# Patient Record
Sex: FEMALE | Race: ASIAN | NOT HISPANIC OR LATINO
[De-identification: names, ages, dates, MRNs, and addresses within clinical notes are randomized per-mention and may not be internally consistent; named-entity substitution may affect disease eponyms.]

---

## 2017-01-17 ENCOUNTER — TRANSCRIPTION ENCOUNTER (OUTPATIENT)
Age: 37
End: 2017-01-17

## 2017-02-08 ENCOUNTER — TRANSCRIPTION ENCOUNTER (OUTPATIENT)
Age: 37
End: 2017-02-08

## 2018-09-07 ENCOUNTER — APPOINTMENT (OUTPATIENT)
Dept: OBGYN | Facility: CLINIC | Age: 38
End: 2018-09-07
Payer: COMMERCIAL

## 2018-09-07 VITALS
WEIGHT: 112.5 LBS | HEART RATE: 73 BPM | HEIGHT: 65 IN | BODY MASS INDEX: 18.74 KG/M2 | SYSTOLIC BLOOD PRESSURE: 117 MMHG | DIASTOLIC BLOOD PRESSURE: 83 MMHG

## 2018-09-07 DIAGNOSIS — Z01.419 ENCOUNTER FOR GYNECOLOGICAL EXAMINATION (GENERAL) (ROUTINE) W/OUT ABNORMAL FINDINGS: ICD-10-CM

## 2018-09-07 DIAGNOSIS — N89.8 OTHER SPECIFIED NONINFLAMMATORY DISORDERS OF VAGINA: ICD-10-CM

## 2018-09-07 PROCEDURE — 99213 OFFICE O/P EST LOW 20 MIN: CPT | Mod: 25

## 2018-09-07 PROCEDURE — 99395 PREV VISIT EST AGE 18-39: CPT

## 2018-09-10 ENCOUNTER — OTHER (OUTPATIENT)
Age: 38
End: 2018-09-10

## 2018-09-10 DIAGNOSIS — B96.89 ACUTE VAGINITIS: ICD-10-CM

## 2018-09-10 DIAGNOSIS — N76.0 ACUTE VAGINITIS: ICD-10-CM

## 2018-10-13 LAB
C TRACH RRNA SPEC QL NAA+PROBE: NOT DETECTED
CANDIDA VAG CYTO: NOT DETECTED
CYTOLOGY CVX/VAG DOC THIN PREP: NORMAL
G VAGINALIS+PREV SP MTYP VAG QL MICRO: DETECTED
HPV HIGH+LOW RISK DNA PNL CVX: NOT DETECTED
N GONORRHOEA RRNA SPEC QL NAA+PROBE: NOT DETECTED
SOURCE AMPLIFICATION: NORMAL
T VAGINALIS VAG QL WET PREP: NOT DETECTED

## 2019-04-08 ENCOUNTER — APPOINTMENT (OUTPATIENT)
Dept: INTERNAL MEDICINE | Facility: CLINIC | Age: 39
End: 2019-04-08

## 2019-07-08 ENCOUNTER — APPOINTMENT (OUTPATIENT)
Dept: FAMILY MEDICINE | Facility: CLINIC | Age: 39
End: 2019-07-08
Payer: COMMERCIAL

## 2019-07-08 ENCOUNTER — LABORATORY RESULT (OUTPATIENT)
Age: 39
End: 2019-07-08

## 2019-07-08 VITALS
HEART RATE: 80 BPM | HEIGHT: 65 IN | DIASTOLIC BLOOD PRESSURE: 76 MMHG | OXYGEN SATURATION: 100 % | SYSTOLIC BLOOD PRESSURE: 111 MMHG | WEIGHT: 121 LBS | BODY MASS INDEX: 20.16 KG/M2

## 2019-07-08 PROCEDURE — 90715 TDAP VACCINE 7 YRS/> IM: CPT

## 2019-07-08 PROCEDURE — 99385 PREV VISIT NEW AGE 18-39: CPT | Mod: 25

## 2019-07-08 PROCEDURE — 90471 IMMUNIZATION ADMIN: CPT

## 2019-07-08 PROCEDURE — 36415 COLL VENOUS BLD VENIPUNCTURE: CPT

## 2019-07-08 RX ORDER — METRONIDAZOLE 7.5 MG/G
0.75 GEL VAGINAL
Qty: 1 | Refills: 0 | Status: COMPLETED | COMMUNITY
Start: 2018-09-10 | End: 2019-07-08

## 2019-07-08 NOTE — HISTORY OF PRESENT ILLNESS
[FreeTextEntry1] : annual physical [de-identified] : 37 yo F with no significant PMH presents for annual physical. It has been 2 years since her last physical. No complaints at this time.

## 2019-07-08 NOTE — HEALTH RISK ASSESSMENT
[] : No [No] : No [No falls in past year] : Patient reported no falls in the past year [de-identified] : regular [de-identified] : walks 1-2 blocks 2-3 times a week [[id number]] : 8 [Patient reported PAP Smear was normal] : Patient reported PAP Smear was normal [HIV test declined] : HIV test declined [] :  [Hepatitis C test declined] : Hepatitis C test declined [Fully functional (using the telephone, shopping, preparing meals, housekeeping, doing laundry, using] : Fully functional and needs no help or supervision to perform IADLs (using the telephone, shopping, preparing meals, housekeeping, doing laundry, using transportation, managing medications and managing finances) [Fully functional (bathing, dressing, toileting, transferring, walking, feeding)] : Fully functional (bathing, dressing, toileting, transferring, walking, feeding) [PapSmearDate] : 09/18

## 2019-07-11 LAB
25(OH)D3 SERPL-MCNC: 21.6 NG/ML
ALBUMIN SERPL ELPH-MCNC: 4.5 G/DL
ALP BLD-CCNC: 87 U/L
ALT SERPL-CCNC: 11 U/L
ANION GAP SERPL CALC-SCNC: 11 MMOL/L
AST SERPL-CCNC: 17 U/L
BASOPHILS # BLD AUTO: 0.04 K/UL
BASOPHILS NFR BLD AUTO: 0.6 %
BILIRUB SERPL-MCNC: 0.4 MG/DL
BUN SERPL-MCNC: 17 MG/DL
CALCIUM SERPL-MCNC: 9.3 MG/DL
CHLORIDE SERPL-SCNC: 105 MMOL/L
CHOLEST SERPL-MCNC: 178 MG/DL
CHOLEST/HDLC SERPL: 2.4 RATIO
CO2 SERPL-SCNC: 26 MMOL/L
CREAT SERPL-MCNC: 0.89 MG/DL
EOSINOPHIL # BLD AUTO: 0.09 K/UL
EOSINOPHIL NFR BLD AUTO: 1.4 %
ESTIMATED AVERAGE GLUCOSE: 105 MG/DL
GLUCOSE SERPL-MCNC: 95 MG/DL
HBA1C MFR BLD HPLC: 5.3 %
HCT VFR BLD CALC: 44.9 %
HDLC SERPL-MCNC: 75 MG/DL
HGB BLD-MCNC: 14.1 G/DL
IMM GRANULOCYTES NFR BLD AUTO: 0.3 %
LDLC SERPL CALC-MCNC: 78 MG/DL
LYMPHOCYTES # BLD AUTO: 2.06 K/UL
LYMPHOCYTES NFR BLD AUTO: 31.4 %
MAN DIFF?: NORMAL
MCHC RBC-ENTMCNC: 31.4 GM/DL
MCHC RBC-ENTMCNC: 32.7 PG
MCV RBC AUTO: 104.2 FL
MONOCYTES # BLD AUTO: 0.57 K/UL
MONOCYTES NFR BLD AUTO: 8.7 %
NEUTROPHILS # BLD AUTO: 3.78 K/UL
NEUTROPHILS NFR BLD AUTO: 57.6 %
PLATELET # BLD AUTO: 232 K/UL
POTASSIUM SERPL-SCNC: 4.8 MMOL/L
PROT SERPL-MCNC: 7.2 G/DL
RBC # BLD: 4.31 M/UL
RBC # FLD: 12.4 %
SODIUM SERPL-SCNC: 142 MMOL/L
TRIGL SERPL-MCNC: 127 MG/DL
TSH SERPL-ACNC: 1.06 UIU/ML
WBC # FLD AUTO: 6.56 K/UL

## 2019-08-13 ENCOUNTER — TRANSCRIPTION ENCOUNTER (OUTPATIENT)
Age: 39
End: 2019-08-13

## 2019-12-10 ENCOUNTER — TRANSCRIPTION ENCOUNTER (OUTPATIENT)
Age: 39
End: 2019-12-10

## 2019-12-22 ENCOUNTER — TRANSCRIPTION ENCOUNTER (OUTPATIENT)
Age: 39
End: 2019-12-22

## 2020-04-25 ENCOUNTER — MESSAGE (OUTPATIENT)
Age: 40
End: 2020-04-25

## 2020-04-29 ENCOUNTER — APPOINTMENT (OUTPATIENT)
Dept: FAMILY MEDICINE | Facility: CLINIC | Age: 40
End: 2020-04-29

## 2020-05-07 LAB
SARS-COV-2 IGG SERPL IA-ACNC: <0.1 INDEX
SARS-COV-2 IGG SERPL QL IA: NEGATIVE

## 2020-05-13 ENCOUNTER — APPOINTMENT (OUTPATIENT)
Dept: OBGYN | Facility: CLINIC | Age: 40
End: 2020-05-13

## 2020-07-16 ENCOUNTER — APPOINTMENT (OUTPATIENT)
Dept: FAMILY MEDICINE | Facility: CLINIC | Age: 40
End: 2020-07-16
Payer: COMMERCIAL

## 2020-07-16 VITALS
BODY MASS INDEX: 22.07 KG/M2 | HEIGHT: 64.17 IN | TEMPERATURE: 99.3 F | WEIGHT: 129.28 LBS | OXYGEN SATURATION: 98 % | SYSTOLIC BLOOD PRESSURE: 106 MMHG | DIASTOLIC BLOOD PRESSURE: 67 MMHG | HEART RATE: 72 BPM

## 2020-07-16 PROCEDURE — 36415 COLL VENOUS BLD VENIPUNCTURE: CPT

## 2020-07-16 PROCEDURE — 99395 PREV VISIT EST AGE 18-39: CPT | Mod: 25

## 2020-07-17 ENCOUNTER — TRANSCRIPTION ENCOUNTER (OUTPATIENT)
Age: 40
End: 2020-07-17

## 2020-07-17 LAB
25(OH)D3 SERPL-MCNC: 32.5 NG/ML
ALBUMIN SERPL ELPH-MCNC: 4.6 G/DL
ALP BLD-CCNC: 70 U/L
ALT SERPL-CCNC: 14 U/L
ANION GAP SERPL CALC-SCNC: 13 MMOL/L
AST SERPL-CCNC: 20 U/L
BASOPHILS # BLD AUTO: 0.04 K/UL
BASOPHILS NFR BLD AUTO: 0.7 %
BILIRUB SERPL-MCNC: 0.4 MG/DL
BUN SERPL-MCNC: 13 MG/DL
CALCIUM SERPL-MCNC: 9.5 MG/DL
CHLORIDE SERPL-SCNC: 104 MMOL/L
CHOLEST SERPL-MCNC: 148 MG/DL
CHOLEST/HDLC SERPL: 2.5 RATIO
CO2 SERPL-SCNC: 25 MMOL/L
CREAT SERPL-MCNC: 0.73 MG/DL
EOSINOPHIL # BLD AUTO: 0.05 K/UL
EOSINOPHIL NFR BLD AUTO: 0.9 %
ESTIMATED AVERAGE GLUCOSE: 103 MG/DL
GLUCOSE SERPL-MCNC: 104 MG/DL
HBA1C MFR BLD HPLC: 5.2 %
HCT VFR BLD CALC: 40.5 %
HDLC SERPL-MCNC: 60 MG/DL
HGB BLD-MCNC: 13.2 G/DL
IMM GRANULOCYTES NFR BLD AUTO: 0.2 %
LDLC SERPL CALC-MCNC: 73 MG/DL
LYMPHOCYTES # BLD AUTO: 1.74 K/UL
LYMPHOCYTES NFR BLD AUTO: 31.9 %
MAN DIFF?: NORMAL
MCHC RBC-ENTMCNC: 32.6 GM/DL
MCHC RBC-ENTMCNC: 32.6 PG
MCV RBC AUTO: 100 FL
MONOCYTES # BLD AUTO: 0.51 K/UL
MONOCYTES NFR BLD AUTO: 9.3 %
NEUTROPHILS # BLD AUTO: 3.11 K/UL
NEUTROPHILS NFR BLD AUTO: 57 %
PLATELET # BLD AUTO: 260 K/UL
POTASSIUM SERPL-SCNC: 4.8 MMOL/L
PROT SERPL-MCNC: 7.2 G/DL
RBC # BLD: 4.05 M/UL
RBC # FLD: 12 %
SODIUM SERPL-SCNC: 142 MMOL/L
TRIGL SERPL-MCNC: 81 MG/DL
TSH SERPL-ACNC: 0.43 UIU/ML
VIT B12 SERPL-MCNC: 402 PG/ML
WBC # FLD AUTO: 5.46 K/UL

## 2020-07-20 ENCOUNTER — APPOINTMENT (OUTPATIENT)
Dept: FAMILY MEDICINE | Facility: CLINIC | Age: 40
End: 2020-07-20

## 2020-12-16 PROBLEM — N76.0 BACTERIAL VAGINOSIS: Status: RESOLVED | Noted: 2018-09-10 | Resolved: 2020-12-16

## 2021-01-17 ENCOUNTER — APPOINTMENT (OUTPATIENT)
Dept: OBGYN | Facility: CLINIC | Age: 41
End: 2021-01-17

## 2021-02-19 ENCOUNTER — NON-APPOINTMENT (OUTPATIENT)
Age: 41
End: 2021-02-19

## 2021-02-19 ENCOUNTER — APPOINTMENT (OUTPATIENT)
Dept: DERMATOLOGY | Facility: CLINIC | Age: 41
End: 2021-02-19
Payer: COMMERCIAL

## 2021-02-19 ENCOUNTER — LABORATORY RESULT (OUTPATIENT)
Age: 41
End: 2021-02-19

## 2021-02-19 VITALS — BODY MASS INDEX: 22.2 KG/M2 | HEIGHT: 64 IN | WEIGHT: 130 LBS

## 2021-02-19 DIAGNOSIS — R23.8 OTHER SKIN CHANGES: ICD-10-CM

## 2021-02-19 PROCEDURE — 99072 ADDL SUPL MATRL&STAF TM PHE: CPT

## 2021-02-19 PROCEDURE — 11104 PUNCH BX SKIN SINGLE LESION: CPT

## 2021-02-19 PROCEDURE — 99203 OFFICE O/P NEW LOW 30 MIN: CPT | Mod: 25,GC

## 2021-02-20 PROBLEM — R23.8 PAPULE OF SKIN: Status: ACTIVE | Noted: 2021-02-20

## 2021-03-05 ENCOUNTER — APPOINTMENT (OUTPATIENT)
Dept: DERMATOLOGY | Facility: CLINIC | Age: 41
End: 2021-03-05

## 2021-03-05 ENCOUNTER — APPOINTMENT (OUTPATIENT)
Dept: DERMATOLOGY | Facility: CLINIC | Age: 41
End: 2021-03-05
Payer: COMMERCIAL

## 2021-03-05 VITALS — HEIGHT: 64 IN

## 2021-03-05 DIAGNOSIS — D48.5 NEOPLASM OF UNCERTAIN BEHAVIOR OF SKIN: ICD-10-CM

## 2021-03-05 PROCEDURE — 99212 OFFICE O/P EST SF 10 MIN: CPT

## 2021-03-05 PROCEDURE — 99072 ADDL SUPL MATRL&STAF TM PHE: CPT

## 2021-12-15 ENCOUNTER — NON-APPOINTMENT (OUTPATIENT)
Age: 41
End: 2021-12-15

## 2021-12-15 ENCOUNTER — APPOINTMENT (OUTPATIENT)
Dept: OBGYN | Facility: CLINIC | Age: 41
End: 2021-12-15
Payer: COMMERCIAL

## 2021-12-15 ENCOUNTER — APPOINTMENT (OUTPATIENT)
Dept: OBGYN | Facility: CLINIC | Age: 41
End: 2021-12-15

## 2021-12-15 VITALS
TEMPERATURE: 97.3 F | BODY MASS INDEX: 22.2 KG/M2 | HEIGHT: 64 IN | HEART RATE: 87 BPM | WEIGHT: 130 LBS | DIASTOLIC BLOOD PRESSURE: 78 MMHG | SYSTOLIC BLOOD PRESSURE: 118 MMHG

## 2021-12-15 DIAGNOSIS — L65.9 NONSCARRING HAIR LOSS, UNSPECIFIED: ICD-10-CM

## 2021-12-15 DIAGNOSIS — N85.2 HYPERTROPHY OF UTERUS: ICD-10-CM

## 2021-12-15 PROCEDURE — 99214 OFFICE O/P EST MOD 30 MIN: CPT | Mod: 25

## 2021-12-15 PROCEDURE — 99386 PREV VISIT NEW AGE 40-64: CPT

## 2021-12-16 ENCOUNTER — ASOB RESULT (OUTPATIENT)
Age: 41
End: 2021-12-16

## 2021-12-16 ENCOUNTER — APPOINTMENT (OUTPATIENT)
Dept: OBGYN | Facility: CLINIC | Age: 41
End: 2021-12-16
Payer: COMMERCIAL

## 2021-12-16 PROCEDURE — 76830 TRANSVAGINAL US NON-OB: CPT

## 2021-12-20 ENCOUNTER — TRANSCRIPTION ENCOUNTER (OUTPATIENT)
Age: 41
End: 2021-12-20

## 2021-12-28 LAB
ANTI-MUELLERIAN HORMONE: 1.65 NG/ML
C TRACH RRNA SPEC QL NAA+PROBE: NOT DETECTED
CYTOLOGY CVX/VAG DOC THIN PREP: NORMAL
FSH SERPL-MCNC: 5.4 IU/L
HPV HIGH+LOW RISK DNA PNL CVX: NOT DETECTED
N GONORRHOEA RRNA SPEC QL NAA+PROBE: NOT DETECTED
SOURCE AMPLIFICATION: NORMAL
T4 FREE SERPL-MCNC: 1.4 NG/DL
TSH SERPL-ACNC: 0.9 UIU/ML

## 2022-04-14 ENCOUNTER — APPOINTMENT (OUTPATIENT)
Dept: FAMILY MEDICINE | Facility: CLINIC | Age: 42
End: 2022-04-14
Payer: COMMERCIAL

## 2022-04-14 VITALS
BODY MASS INDEX: 22.02 KG/M2 | HEIGHT: 64 IN | WEIGHT: 129 LBS | SYSTOLIC BLOOD PRESSURE: 119 MMHG | HEART RATE: 74 BPM | DIASTOLIC BLOOD PRESSURE: 81 MMHG | OXYGEN SATURATION: 100 %

## 2022-04-14 DIAGNOSIS — R06.02 SHORTNESS OF BREATH: ICD-10-CM

## 2022-04-14 PROCEDURE — 99396 PREV VISIT EST AGE 40-64: CPT

## 2022-04-14 NOTE — HISTORY OF PRESENT ILLNESS
[FreeTextEntry1] : annual [de-identified] : 42 yo F with no significant PMH presents for annual. \par \par Had covid Dec 2021-- SOB/fever/loss of taste and smell. Has issues with breathing since then-- anne with activities and prolonged talking. Feels need to catch breath.\par \par diet-- good\par exercise-- could be better but has SOB.

## 2022-04-14 NOTE — HEALTH RISK ASSESSMENT
[Patient reported PAP Smear was normal] : Patient reported PAP Smear was normal [PapSmearDate] : 12/21

## 2022-04-15 ENCOUNTER — NON-APPOINTMENT (OUTPATIENT)
Age: 42
End: 2022-04-15

## 2022-04-15 LAB
25(OH)D3 SERPL-MCNC: 15 NG/ML
ALBUMIN SERPL ELPH-MCNC: 4.3 G/DL
ALP BLD-CCNC: 79 U/L
ALT SERPL-CCNC: 15 U/L
ANION GAP SERPL CALC-SCNC: 11 MMOL/L
AST SERPL-CCNC: 21 U/L
BASOPHILS # BLD AUTO: 0.04 K/UL
BASOPHILS NFR BLD AUTO: 0.5 %
BILIRUB SERPL-MCNC: 0.3 MG/DL
BUN SERPL-MCNC: 19 MG/DL
CALCIUM SERPL-MCNC: 9.1 MG/DL
CHLORIDE SERPL-SCNC: 107 MMOL/L
CHOLEST SERPL-MCNC: 170 MG/DL
CO2 SERPL-SCNC: 24 MMOL/L
CREAT SERPL-MCNC: 0.68 MG/DL
EGFR: 113 ML/MIN/1.73M2
EOSINOPHIL # BLD AUTO: 0.08 K/UL
EOSINOPHIL NFR BLD AUTO: 1.1 %
ESTIMATED AVERAGE GLUCOSE: 111 MG/DL
GLUCOSE SERPL-MCNC: 92 MG/DL
HBA1C MFR BLD HPLC: 5.5 %
HCT VFR BLD CALC: 41.8 %
HDLC SERPL-MCNC: 63 MG/DL
HGB BLD-MCNC: 13.7 G/DL
IMM GRANULOCYTES NFR BLD AUTO: 0.3 %
LDLC SERPL CALC-MCNC: 81 MG/DL
LYMPHOCYTES # BLD AUTO: 2.3 K/UL
LYMPHOCYTES NFR BLD AUTO: 30.5 %
MAN DIFF?: NORMAL
MCHC RBC-ENTMCNC: 31.1 PG
MCHC RBC-ENTMCNC: 32.8 GM/DL
MCV RBC AUTO: 95 FL
MONOCYTES # BLD AUTO: 0.6 K/UL
MONOCYTES NFR BLD AUTO: 8 %
NEUTROPHILS # BLD AUTO: 4.49 K/UL
NEUTROPHILS NFR BLD AUTO: 59.6 %
NONHDLC SERPL-MCNC: 107 MG/DL
PLATELET # BLD AUTO: 269 K/UL
POTASSIUM SERPL-SCNC: 4.3 MMOL/L
PROT SERPL-MCNC: 7.2 G/DL
RBC # BLD: 4.4 M/UL
RBC # FLD: 12.2 %
SODIUM SERPL-SCNC: 142 MMOL/L
TRIGL SERPL-MCNC: 128 MG/DL
TSH SERPL-ACNC: 1.04 UIU/ML
WBC # FLD AUTO: 7.53 K/UL

## 2022-06-08 ENCOUNTER — NON-APPOINTMENT (OUTPATIENT)
Age: 42
End: 2022-06-08

## 2022-06-15 ENCOUNTER — APPOINTMENT (OUTPATIENT)
Dept: OBGYN | Facility: CLINIC | Age: 42
End: 2022-06-15
Payer: COMMERCIAL

## 2022-06-15 ENCOUNTER — ASOB RESULT (OUTPATIENT)
Age: 42
End: 2022-06-15

## 2022-06-15 VITALS
BODY MASS INDEX: 22.71 KG/M2 | DIASTOLIC BLOOD PRESSURE: 76 MMHG | HEART RATE: 72 BPM | SYSTOLIC BLOOD PRESSURE: 117 MMHG | HEIGHT: 64 IN | WEIGHT: 133 LBS

## 2022-06-15 PROCEDURE — 76817 TRANSVAGINAL US OBSTETRIC: CPT

## 2022-06-15 PROCEDURE — ZZZZZ: CPT | Mod: 1L

## 2022-06-15 RX ORDER — PNV/FERROUS SULFATE/FOLIC ACID 27-<0.5MG
TABLET ORAL
Refills: 0 | Status: ACTIVE | COMMUNITY

## 2022-06-16 LAB
ABO + RH PNL BLD: NORMAL
BASOPHILS # BLD AUTO: 0.04 K/UL
BASOPHILS NFR BLD AUTO: 0.4 %
BLD GP AB SCN SERPL QL: NORMAL
C TRACH RRNA SPEC QL NAA+PROBE: NOT DETECTED
CMV IGG SERPL QL: 1.2 U/ML
CMV IGG SERPL-IMP: POSITIVE
CMV IGM SERPL QL: <8 AU/ML
CMV IGM SERPL QL: NEGATIVE
EOSINOPHIL # BLD AUTO: 0.07 K/UL
EOSINOPHIL NFR BLD AUTO: 0.7 %
HBV SURFACE AG SER QL: NONREACTIVE
HCT VFR BLD CALC: 39.2 %
HGB A MFR BLD: 96.7 %
HGB A2 MFR BLD: 2.8 %
HGB BLD-MCNC: 13.3 G/DL
HGB F MFR BLD: 0.5 %
HGB FRACT BLD-IMP: NORMAL
HIV1+2 AB SPEC QL IA.RAPID: NONREACTIVE
IMM GRANULOCYTES NFR BLD AUTO: 0.4 %
LYMPHOCYTES # BLD AUTO: 1.9 K/UL
LYMPHOCYTES NFR BLD AUTO: 18.9 %
MAN DIFF?: NORMAL
MCHC RBC-ENTMCNC: 32 PG
MCHC RBC-ENTMCNC: 33.9 GM/DL
MCV RBC AUTO: 94.2 FL
MEV IGG FLD QL IA: <5 AU/ML
MEV IGG+IGM SER-IMP: NEGATIVE
MONOCYTES # BLD AUTO: 0.73 K/UL
MONOCYTES NFR BLD AUTO: 7.3 %
N GONORRHOEA RRNA SPEC QL NAA+PROBE: NOT DETECTED
NEUTROPHILS # BLD AUTO: 7.28 K/UL
NEUTROPHILS NFR BLD AUTO: 72.3 %
PLATELET # BLD AUTO: 266 K/UL
RBC # BLD: 4.16 M/UL
RBC # FLD: 12.6 %
RUBV IGG FLD-ACNC: 1.2 INDEX
RUBV IGG SER-IMP: POSITIVE
SOURCE AMPLIFICATION: NORMAL
T GONDII AB SER-IMP: NEGATIVE
T GONDII AB SER-IMP: NEGATIVE
T GONDII IGG SER QL: <3 IU/ML
T GONDII IGM SER QL: <3 AU/ML
T PALLIDUM AB SER QL IA: NEGATIVE
VZV AB TITR SER: POSITIVE
VZV IGG SER IF-ACNC: 836.7 INDEX
WBC # FLD AUTO: 10.06 K/UL

## 2022-06-17 LAB
BACTERIA UR CULT: NORMAL
LEAD BLD-MCNC: <1 UG/DL
MEV IGM SER QL: <0.91 ISR

## 2022-06-24 LAB
B19V IGG SER QL IA: 8.7 INDEX
B19V IGG+IGM SER-IMP: NEGATIVE
B19V IGG+IGM SER-IMP: NORMAL
B19V IGM FLD-ACNC: 0.08 INDEX
B19V IGM SER-ACNC: NEGATIVE

## 2022-07-19 ENCOUNTER — APPOINTMENT (OUTPATIENT)
Dept: OBGYN | Facility: CLINIC | Age: 42
End: 2022-07-19

## 2022-07-19 VITALS
WEIGHT: 133 LBS | SYSTOLIC BLOOD PRESSURE: 139 MMHG | DIASTOLIC BLOOD PRESSURE: 83 MMHG | HEART RATE: 96 BPM | BODY MASS INDEX: 22.71 KG/M2 | HEIGHT: 64 IN

## 2022-07-27 ENCOUNTER — LABORATORY RESULT (OUTPATIENT)
Age: 42
End: 2022-07-27

## 2022-07-27 ENCOUNTER — APPOINTMENT (OUTPATIENT)
Dept: ANTEPARTUM | Facility: CLINIC | Age: 42
End: 2022-07-27

## 2022-07-27 ENCOUNTER — ASOB RESULT (OUTPATIENT)
Age: 42
End: 2022-07-27

## 2022-07-27 PROCEDURE — 36415 COLL VENOUS BLD VENIPUNCTURE: CPT

## 2022-07-27 PROCEDURE — 76813 OB US NUCHAL MEAS 1 GEST: CPT

## 2022-07-27 PROCEDURE — 76801 OB US < 14 WKS SINGLE FETUS: CPT

## 2022-08-01 ENCOUNTER — NON-APPOINTMENT (OUTPATIENT)
Age: 42
End: 2022-08-01

## 2022-08-11 ENCOUNTER — APPOINTMENT (OUTPATIENT)
Dept: PEDIATRIC MEDICAL GENETICS | Facility: CLINIC | Age: 42
End: 2022-08-11

## 2022-08-11 PROCEDURE — 96040: CPT | Mod: 95

## 2022-08-12 ENCOUNTER — NON-APPOINTMENT (OUTPATIENT)
Age: 42
End: 2022-08-12

## 2022-08-17 ENCOUNTER — APPOINTMENT (OUTPATIENT)
Dept: OBGYN | Facility: CLINIC | Age: 42
End: 2022-08-17

## 2022-08-17 ENCOUNTER — LABORATORY RESULT (OUTPATIENT)
Age: 42
End: 2022-08-17

## 2022-08-17 VITALS
HEART RATE: 83 BPM | SYSTOLIC BLOOD PRESSURE: 143 MMHG | BODY MASS INDEX: 22.71 KG/M2 | DIASTOLIC BLOOD PRESSURE: 89 MMHG | WEIGHT: 133 LBS | HEIGHT: 64 IN

## 2022-08-18 ENCOUNTER — NON-APPOINTMENT (OUTPATIENT)
Age: 42
End: 2022-08-18

## 2022-08-18 ENCOUNTER — APPOINTMENT (OUTPATIENT)
Dept: CARDIOLOGY | Facility: CLINIC | Age: 42
End: 2022-08-18

## 2022-08-18 ENCOUNTER — LABORATORY RESULT (OUTPATIENT)
Age: 42
End: 2022-08-18

## 2022-08-18 VITALS — SYSTOLIC BLOOD PRESSURE: 120 MMHG | DIASTOLIC BLOOD PRESSURE: 70 MMHG

## 2022-08-18 VITALS
BODY MASS INDEX: 22.2 KG/M2 | TEMPERATURE: 98.2 F | HEART RATE: 83 BPM | OXYGEN SATURATION: 98 % | SYSTOLIC BLOOD PRESSURE: 115 MMHG | WEIGHT: 130 LBS | DIASTOLIC BLOOD PRESSURE: 71 MMHG | HEIGHT: 64 IN

## 2022-08-18 DIAGNOSIS — Z13.228 ENCOUNTER FOR SCREENING FOR OTHER METABOLIC DISORDERS: ICD-10-CM

## 2022-08-18 DIAGNOSIS — R01.1 CARDIAC MURMUR, UNSPECIFIED: ICD-10-CM

## 2022-08-18 PROCEDURE — 93000 ELECTROCARDIOGRAM COMPLETE: CPT

## 2022-08-18 PROCEDURE — 99204 OFFICE O/P NEW MOD 45 MIN: CPT

## 2022-08-18 NOTE — HISTORY OF PRESENT ILLNESS
[FreeTextEntry1] : This is a 42 year old female  15 weeks pregnant who presents to the office as a new patient for cardiac eval. pt states she went to her OBGYN and was told her BP is 143/89 \par \par Pt denies any CP, SOB, heart palpitations, dizziness, abdominal pain N/V/D fever or chills\par \par \par PCP Dr. Rick- seen in april 2022

## 2022-08-18 NOTE — PHYSICAL EXAM
[Well Developed] : well developed [Well Nourished] : well nourished [No Acute Distress] : no acute distress [Normal Conjunctiva] : normal conjunctiva [Normal Venous Pressure] : normal venous pressure [No Carotid Bruit] : no carotid bruit [Normal S1, S2] : normal S1, S2 [No Rub] : no rub [No Gallop] : no gallop [Clear Lung Fields] : clear lung fields [Good Air Entry] : good air entry [No Respiratory Distress] : no respiratory distress  [Soft] : abdomen soft [Non Tender] : non-tender [No Masses/organomegaly] : no masses/organomegaly [Normal Bowel Sounds] : normal bowel sounds [Normal Gait] : normal gait [No Edema] : no edema [No Cyanosis] : no cyanosis [No Clubbing] : no clubbing [No Varicosities] : no varicosities [No Rash] : no rash [No Skin Lesions] : no skin lesions [Moves all extremities] : moves all extremities [No Focal Deficits] : no focal deficits [Normal Speech] : normal speech [Alert and Oriented] : alert and oriented [Normal memory] : normal memory [Murmur] : murmur [de-identified] : 1/6 systolic murnmur apex

## 2022-08-27 ENCOUNTER — APPOINTMENT (OUTPATIENT)
Dept: CARDIOLOGY | Facility: CLINIC | Age: 42
End: 2022-08-27

## 2022-08-31 ENCOUNTER — NON-APPOINTMENT (OUTPATIENT)
Age: 42
End: 2022-08-31

## 2022-09-05 ENCOUNTER — OUTPATIENT (OUTPATIENT)
Dept: INPATIENT UNIT | Facility: HOSPITAL | Age: 42
LOS: 1 days | Discharge: HOME | End: 2022-09-05

## 2022-09-05 VITALS — SYSTOLIC BLOOD PRESSURE: 125 MMHG | DIASTOLIC BLOOD PRESSURE: 67 MMHG | HEART RATE: 79 BPM

## 2022-09-05 VITALS
SYSTOLIC BLOOD PRESSURE: 116 MMHG | RESPIRATION RATE: 16 BRPM | DIASTOLIC BLOOD PRESSURE: 66 MMHG | HEART RATE: 89 BPM | TEMPERATURE: 98 F

## 2022-09-05 DIAGNOSIS — O26.899 OTHER SPECIFIED PREGNANCY RELATED CONDITIONS, UNSPECIFIED TRIMESTER: ICD-10-CM

## 2022-09-05 DIAGNOSIS — Z3A.00 WEEKS OF GESTATION OF PREGNANCY NOT SPECIFIED: ICD-10-CM

## 2022-09-05 NOTE — OB PROVIDER TRIAGE NOTE - HISTORY OF PRESENT ILLNESS
43yo  18+0 presenting to triage for vaginal bleeding s/p coitus. Pt. denies active vaginal bleeding at this time, visualization of fluids, fever, cough, chills, sob, palpitations, n/v.

## 2022-09-05 NOTE — OB PROVIDER TRIAGE NOTE - ADDITIONAL INSTRUCTIONS
43yo  18+0 presenting to triage for vaginal bleeding s/p coitus.   Pt. educated on the trigger symptoms of when to return to triage, pt. verbalizes understanding   Pt. denies active vaginal bleeding at this time, visualization of fluids, fever, cough, chills, sob, palpitations, n/v.  Pt. educated on chance for mild spotting s/p coitus, pt. verbalizes understanding and educated on bleeding that envokes concern to return to triage ASAP, pt. verbalizes understanding  Pt. to follow-up outpatient for routine scheduled visit with private ObGyn  Discussed with

## 2022-09-05 NOTE — OB PROVIDER TRIAGE NOTE - NSHPPHYSICALEXAM_GEN_ALL_CORE
Abdomen: Mildly protuberant  Speculum:No active vaginal bleeding  TAUS:FH visualized, fundal placental anchored to endometrium, fetus transverse  TVUS: Audible FH, CL:4.87cm

## 2022-09-05 NOTE — OB PROVIDER TRIAGE NOTE - NSOBPROVIDERNOTE_OBGYN_ALL_OB_FT
43yo  18+0 presenting to triage for vaginal bleeding s/p coitus.   Abdomen: Mildly protuberant  Speculum:No active vaginal bleeding  TAUS:FH visualized, fundal placental anchored to endometrium, fetus transverse  TVUS: Audible FH, CL:4.87cm  Pt. educated on the trigger symptoms of when to return to triage, pt. verbalizes understanding   Pt. denies active vaginal bleeding at this time, visualization of fluids, fever, cough, chills, sob, palpitations, n/v.  Pt. educated on chance for mild spotting s/p coitus, pt. verbalizes understanding and educated on bleeding that envokes concern to return to triage ASAP, pt. verbalizes understanding  Pt. to follow-up outpatient for routine scheduled visit with private ObGyn  Discussed with

## 2022-09-05 NOTE — OB RN TRIAGE NOTE - PRO MENTAL HEALTH SX RECENT
none Ketoconazole Pregnancy And Lactation Text: This medication is Pregnancy Category C and it isn't know if it is safe during pregnancy. It is also excreted in breast milk and breast feeding isn't recommended.

## 2022-09-16 ENCOUNTER — APPOINTMENT (OUTPATIENT)
Dept: OBGYN | Facility: CLINIC | Age: 42
End: 2022-09-16

## 2022-09-16 VITALS
BODY MASS INDEX: 23.39 KG/M2 | HEIGHT: 64 IN | WEIGHT: 137 LBS | SYSTOLIC BLOOD PRESSURE: 138 MMHG | HEART RATE: 80 BPM | DIASTOLIC BLOOD PRESSURE: 82 MMHG

## 2022-09-16 VITALS — DIASTOLIC BLOOD PRESSURE: 82 MMHG | HEART RATE: 80 BPM | SYSTOLIC BLOOD PRESSURE: 138 MMHG

## 2022-09-16 VITALS
HEIGHT: 64 IN | HEART RATE: 83 BPM | BODY MASS INDEX: 23.39 KG/M2 | DIASTOLIC BLOOD PRESSURE: 80 MMHG | SYSTOLIC BLOOD PRESSURE: 150 MMHG | WEIGHT: 137 LBS

## 2022-09-16 PROCEDURE — 0502F SUBSEQUENT PRENATAL CARE: CPT

## 2022-09-21 ENCOUNTER — ASOB RESULT (OUTPATIENT)
Age: 42
End: 2022-09-21

## 2022-09-21 ENCOUNTER — APPOINTMENT (OUTPATIENT)
Dept: ANTEPARTUM | Facility: CLINIC | Age: 42
End: 2022-09-21

## 2022-09-21 PROCEDURE — 76811 OB US DETAILED SNGL FETUS: CPT

## 2022-09-24 ENCOUNTER — APPOINTMENT (OUTPATIENT)
Dept: CARDIOLOGY | Facility: CLINIC | Age: 42
End: 2022-09-24

## 2022-09-24 PROCEDURE — 93306 TTE W/DOPPLER COMPLETE: CPT

## 2022-09-27 ENCOUNTER — NON-APPOINTMENT (OUTPATIENT)
Age: 42
End: 2022-09-27

## 2022-10-05 ENCOUNTER — NON-APPOINTMENT (OUTPATIENT)
Age: 42
End: 2022-10-05

## 2022-10-25 ENCOUNTER — APPOINTMENT (OUTPATIENT)
Dept: OBGYN | Facility: CLINIC | Age: 42
End: 2022-10-25
Payer: COMMERCIAL

## 2022-10-25 VITALS
HEIGHT: 64 IN | DIASTOLIC BLOOD PRESSURE: 85 MMHG | BODY MASS INDEX: 24.75 KG/M2 | HEART RATE: 88 BPM | SYSTOLIC BLOOD PRESSURE: 128 MMHG | WEIGHT: 145 LBS

## 2022-10-25 DIAGNOSIS — R00.9 UNSPECIFIED ABNORMALITIES OF HEART BEAT: ICD-10-CM

## 2022-10-25 PROCEDURE — 0502F SUBSEQUENT PRENATAL CARE: CPT

## 2022-10-26 ENCOUNTER — APPOINTMENT (OUTPATIENT)
Dept: ENDOCRINOLOGY | Facility: CLINIC | Age: 42
End: 2022-10-26

## 2022-10-27 ENCOUNTER — APPOINTMENT (OUTPATIENT)
Dept: PEDIATRIC CARDIOLOGY | Facility: CLINIC | Age: 42
End: 2022-10-27

## 2022-10-27 ENCOUNTER — NON-APPOINTMENT (OUTPATIENT)
Age: 42
End: 2022-10-27

## 2022-10-27 PROCEDURE — 76821 MIDDLE CEREBRAL ARTERY ECHO: CPT

## 2022-10-27 PROCEDURE — 76825 ECHO EXAM OF FETAL HEART: CPT

## 2022-10-27 PROCEDURE — 76820 UMBILICAL ARTERY ECHO: CPT

## 2022-10-27 PROCEDURE — 93325 DOPPLER ECHO COLOR FLOW MAPG: CPT | Mod: 59

## 2022-10-27 PROCEDURE — 76827 ECHO EXAM OF FETAL HEART: CPT

## 2022-10-27 PROCEDURE — 99203 OFFICE O/P NEW LOW 30 MIN: CPT | Mod: 25

## 2022-10-28 ENCOUNTER — APPOINTMENT (OUTPATIENT)
Dept: CARDIOLOGY | Facility: CLINIC | Age: 42
End: 2022-10-28

## 2022-10-28 VITALS
SYSTOLIC BLOOD PRESSURE: 112 MMHG | DIASTOLIC BLOOD PRESSURE: 66 MMHG | OXYGEN SATURATION: 99 % | TEMPERATURE: 98.1 F | HEIGHT: 64 IN | HEART RATE: 80 BPM | WEIGHT: 146 LBS | BODY MASS INDEX: 24.92 KG/M2

## 2022-10-28 DIAGNOSIS — D64.9 ANEMIA, UNSPECIFIED: ICD-10-CM

## 2022-10-28 DIAGNOSIS — R03.0 ELEVATED BLOOD-PRESSURE READING, W/OUT DIAGNOSIS OF HYPERTENSION: ICD-10-CM

## 2022-10-28 DIAGNOSIS — R94.6 ABNORMAL RESULTS OF THYROID FUNCTION STUDIES: ICD-10-CM

## 2022-10-28 DIAGNOSIS — Z3A.25 25 WEEKS GESTATION OF PREGNANCY: ICD-10-CM

## 2022-10-28 PROCEDURE — 99213 OFFICE O/P EST LOW 20 MIN: CPT

## 2022-10-28 NOTE — HISTORY OF PRESENT ILLNESS
[FreeTextEntry1] : This is a 42 year old pregnant female who presents to the office for f/u for BP watch\par pt reports feeling well reports BP are controlled at home\par currently 25 weeks pregnant \par \par Pt denies any CP, SOB, heart palpitations, dizziness, abdominal pain N/V/D fever or chills\par

## 2022-10-30 LAB
BASOPHILS # BLD AUTO: 0.04 K/UL
BASOPHILS NFR BLD AUTO: 0.4 %
EOSINOPHIL # BLD AUTO: 0.11 K/UL
EOSINOPHIL NFR BLD AUTO: 1.1 %
GLUCOSE 1H P 50 G GLC PO SERPL-MCNC: 129 MG/DL
HCT VFR BLD CALC: 34.3 %
HGB BLD-MCNC: 11.2 G/DL
IMM GRANULOCYTES NFR BLD AUTO: 1.5 %
LYMPHOCYTES # BLD AUTO: 1.86 K/UL
LYMPHOCYTES NFR BLD AUTO: 19.4 %
MAN DIFF?: NORMAL
MCHC RBC-ENTMCNC: 32.7 GM/DL
MCHC RBC-ENTMCNC: 33.1 PG
MCV RBC AUTO: 101.5 FL
MONOCYTES # BLD AUTO: 0.71 K/UL
MONOCYTES NFR BLD AUTO: 7.4 %
NEUTROPHILS # BLD AUTO: 6.71 K/UL
NEUTROPHILS NFR BLD AUTO: 70.2 %
PLATELET # BLD AUTO: 240 K/UL
RBC # BLD: 3.38 M/UL
RBC # FLD: 13.5 %
WBC # FLD AUTO: 9.57 K/UL

## 2022-11-02 ENCOUNTER — NON-APPOINTMENT (OUTPATIENT)
Age: 42
End: 2022-11-02

## 2022-11-07 ENCOUNTER — OUTPATIENT (OUTPATIENT)
Dept: INPATIENT UNIT | Facility: HOSPITAL | Age: 42
LOS: 1 days | Discharge: HOME | End: 2022-11-07

## 2022-11-07 ENCOUNTER — NON-APPOINTMENT (OUTPATIENT)
Age: 42
End: 2022-11-07

## 2022-11-07 ENCOUNTER — APPOINTMENT (OUTPATIENT)
Dept: ENDOCRINOLOGY | Facility: CLINIC | Age: 42
End: 2022-11-07

## 2022-11-07 VITALS
SYSTOLIC BLOOD PRESSURE: 115 MMHG | TEMPERATURE: 98 F | DIASTOLIC BLOOD PRESSURE: 79 MMHG | HEART RATE: 83 BPM | RESPIRATION RATE: 16 BRPM

## 2022-11-07 VITALS — HEART RATE: 83 BPM | DIASTOLIC BLOOD PRESSURE: 79 MMHG | SYSTOLIC BLOOD PRESSURE: 115 MMHG

## 2022-11-07 DIAGNOSIS — Z3A.00 WEEKS OF GESTATION OF PREGNANCY NOT SPECIFIED: ICD-10-CM

## 2022-11-07 DIAGNOSIS — O26.899 OTHER SPECIFIED PREGNANCY RELATED CONDITIONS, UNSPECIFIED TRIMESTER: ICD-10-CM

## 2022-11-07 LAB
APPEARANCE UR: CLEAR — SIGNIFICANT CHANGE UP
BILIRUB UR-MCNC: NEGATIVE — SIGNIFICANT CHANGE UP
COLOR SPEC: SIGNIFICANT CHANGE UP
DIFF PNL FLD: NEGATIVE — SIGNIFICANT CHANGE UP
GLUCOSE UR QL: NEGATIVE — SIGNIFICANT CHANGE UP
KETONES UR-MCNC: NEGATIVE — SIGNIFICANT CHANGE UP
LEUKOCYTE ESTERASE UR-ACNC: NEGATIVE — SIGNIFICANT CHANGE UP
NITRITE UR-MCNC: NEGATIVE — SIGNIFICANT CHANGE UP
PH UR: 6 — SIGNIFICANT CHANGE UP (ref 5–8)
PROT UR-MCNC: NEGATIVE — SIGNIFICANT CHANGE UP
SP GR SPEC: 1.02 — SIGNIFICANT CHANGE UP (ref 1.01–1.05)
UROBILINOGEN FLD QL: SIGNIFICANT CHANGE UP

## 2022-11-07 PROCEDURE — 99213 OFFICE O/P EST LOW 20 MIN: CPT | Mod: 25

## 2022-11-07 PROCEDURE — 76818 FETAL BIOPHYS PROFILE W/NST: CPT | Mod: 26

## 2022-11-07 PROCEDURE — 76830 TRANSVAGINAL US NON-OB: CPT | Mod: 26

## 2022-11-07 NOTE — OB PROVIDER TRIAGE NOTE - HISTORY OF PRESENT ILLNESS
41 yo , EGA@27 weeks, presented to D&T with c/o contractions irregular, 5-6 times per hr, starting from yesterday, pain 3-4/10, pt denies taking any pain mediation. pt report that she had intercourse last night.  denies vaginal bleeding, leakage of fluid, and reports positive fetal movement.  Denies fever, chills, headaches, changes in vision, chest pain, palpitations, shortness of breath, cough, nausea, vomiting, diarrhea, constipation, urinary symptoms, edema.    Prenatal care with Dr. Lea  Prenatal course is complicated by GHTN    Patient denies signs and symptoms of COVID 19; denies symptomatic illness; fully vaccinated.    No adverse reactions to anesthesia, no objections to blood transfusions if clinically indicated.  OB hx:   Med hx: denies   Surg hx: denies  GYN hx: denies hx of abnormal papsmear/cysts/fibroids/STDs  Meds: PNV, ASA alternating 1-2 tabs per day  Allergies: NKDA    Social hx: Denies alcohol, tobacco, drug use  Psych hx: denies hx of anxiety/depression; lives with spouse and little girl     43 yo , EGA@27 weeks, presented to D&T with c/o contractions irregular, 5-6 times per hr, starting from yesterday, pain 3-4/10, pt denies taking any pain mediation. pt report that she had intercourse last night.  denies vaginal bleeding, leakage of fluid, and reports positive fetal movement.  Denies fever, chills, headaches, changes in vision, chest pain, palpitations, shortness of breath, cough, nausea, vomiting, diarrhea, constipation, urinary symptoms, edema.    Prenatal care with Dr. Lea  Prenatal course is complicated by GHTN    Patient denies signs and symptoms of COVID 19; denies symptomatic illness; fully vaccinated.    No adverse reactions to anesthesia, no objections to blood transfusions if clinically indicated.  OB hx:   Med hx: denies   Surg hx: denies  GYN hx: denies hx of abnormal papsmear/cysts/fibroids/STDs  Meds: PNV, ASA alternating 1-2 tabs per day  Allergies: NKDA    Social hx: Denies alcohol, tobacco, drug use  Psych hx: denies hx of anxiety/depression; lives with spouse and little girl     43 yo , EGA@27 weeks, presented to D&T with c/o contractions irregular, 5-6 times per hr, starting from yesterday, pain 3-4/10, pt denies taking any pain mediation. pt report that she had intercourse last night.  denies vaginal bleeding, leakage of fluid, and reports positive fetal movement.  Denies fever, chills, headaches, changes in vision, chest pain, palpitations, shortness of breath, cough, nausea, vomiting, diarrhea, constipation, urinary symptoms, edema.    Prenatal care with Dr. Lea  Prenatal course is complicated by GHTN  followed with cardiologist Dr Salgado for elevated bp's testing wnl last seen last week    Patient denies signs and symptoms of COVID 19; denies symptomatic illness; fully vaccinated.    No adverse reactions to anesthesia, no objections to blood transfusions if clinically indicated.  OB hx:  2011  @ 40 weeks F 8-2 no complication  Med hx: denies   Surg hx: denies  GYN hx: denies hx of abnormal papsmear/cysts/fibroids/STDs  Meds: PNV, ASA alternating 1-2 tabs per day  Allergies: NKDA    Social hx: Denies alcohol, tobacco, drug use  Psych hx: denies hx of anxiety/depression; lives with spouse and little girl

## 2022-11-07 NOTE — OB PROVIDER TRIAGE NOTE - NSOBPROVIDERNOTE_OBGYN_ALL_OB_FT
43 yo , EGA@27 weeks R/O Pre-term labor. 43 yo , EGA@27 weeks R/O Pre-term labor.   discuss with Dr. Beckett  no S/S of pre-term labor at this time  pt to keep self very well hydrated.    stable for discharge  Discharge patient home.  pre-term Labor precautions and fetal movements count were reviewed; if not in labor, will follow up with OB for the next schedule appointment.  Prior notes, lab results (prenatal chart review, prenatal labs) and recommendations were reviewed;  All ordered tests results reviewed and interpreted.  Plan of care was reviewed with patient and family; patient states understanding of the above plan.  In total 35 minutes spent with established/new patient.

## 2022-11-07 NOTE — OB PROVIDER TRIAGE NOTE - NSHPPHYSICALEXAM_GEN_ALL_CORE
Vital Signs Last 24 Hrs  HR: 81 (07 Nov 2022 19:23) (78 - 87)  BP: 126/80 (07 Nov 2022 19:23) (121/76 - 126/80)    Gen: NAD  Head: NC/AT  Cardio: S1S2+, RRR  Resp: CTABL, no wheezing  Abdomen: Soft, NT/ND, BS+  Extremities: No LE edema bilaterally    NST and BPP done to assess fetal surveillance and results as follows:  NST-->FHR: 135 HR baseline, moderate variability, accelerations present, no decelerations, Category 1.  Captree: Contractions present, irregular, Vital Signs Last 24 Hrs  HR: 81 (07 Nov 2022 19:23) (78 - 87)  BP: 126/80 (07 Nov 2022 19:23) (121/76 - 126/80)    Gen: NAD  Head: NC/AT  Cardio: S1S2+, RRR  Resp: CTABL, no wheezing  Abdomen: Soft, NT/ND, BS+  Extremities: No LE edema bilaterally    NST and BPP done to assess fetal surveillance and results as follows:  NST-->FHR: 135 HR baseline, moderate variability, accelerations present, no decelerations, Category 1.  Toftrees: Contractions present, irregular,  TAUS: breech presentation, anterior placenta, JOSE 21.38, BPP 8/8  SSE: scant amount of leukorrhea, cervix appear close. no bleeding noted, FFN collected and held  TVUS: cervical length 3.74-4.08, no dynamical changes noted.

## 2022-11-07 NOTE — OB PROVIDER TRIAGE NOTE - NSHPLABSRESULTS_GEN_ALL_CORE
Urinalysis Basic - ( 2022 19:20 )    Color: Light Yellow / Appearance: Clear / S.020 / pH: x  Gluc: x / Ketone: Negative  / Bili: Negative / Urobili: <2 mg/dL   Blood: x / Protein: Negative / Nitrite: Negative   Leuk Esterase: Negative / RBC: x / WBC x   Sq Epi: x / Non Sq Epi: x / Bacteria: x

## 2022-11-14 ENCOUNTER — APPOINTMENT (OUTPATIENT)
Dept: ANTEPARTUM | Facility: CLINIC | Age: 42
End: 2022-11-14

## 2022-11-14 ENCOUNTER — ASOB RESULT (OUTPATIENT)
Age: 42
End: 2022-11-14

## 2022-11-14 PROCEDURE — 76816 OB US FOLLOW-UP PER FETUS: CPT

## 2022-11-14 PROCEDURE — 76819 FETAL BIOPHYS PROFIL W/O NST: CPT

## 2022-11-15 ENCOUNTER — APPOINTMENT (OUTPATIENT)
Dept: OBGYN | Facility: CLINIC | Age: 42
End: 2022-11-15

## 2022-11-15 VITALS
BODY MASS INDEX: 25.74 KG/M2 | DIASTOLIC BLOOD PRESSURE: 81 MMHG | HEIGHT: 64 IN | WEIGHT: 150.8 LBS | HEART RATE: 91 BPM | SYSTOLIC BLOOD PRESSURE: 124 MMHG

## 2022-11-15 PROCEDURE — 90715 TDAP VACCINE 7 YRS/> IM: CPT

## 2022-11-15 PROCEDURE — 90471 IMMUNIZATION ADMIN: CPT

## 2022-12-06 ENCOUNTER — NON-APPOINTMENT (OUTPATIENT)
Age: 42
End: 2022-12-06

## 2022-12-06 ENCOUNTER — APPOINTMENT (OUTPATIENT)
Dept: OBGYN | Facility: CLINIC | Age: 42
End: 2022-12-06
Payer: COMMERCIAL

## 2022-12-06 VITALS
BODY MASS INDEX: 25.61 KG/M2 | DIASTOLIC BLOOD PRESSURE: 82 MMHG | WEIGHT: 150 LBS | HEIGHT: 64 IN | SYSTOLIC BLOOD PRESSURE: 120 MMHG

## 2022-12-06 PROCEDURE — 0502F SUBSEQUENT PRENATAL CARE: CPT

## 2022-12-12 ENCOUNTER — ASOB RESULT (OUTPATIENT)
Age: 42
End: 2022-12-12

## 2022-12-12 ENCOUNTER — APPOINTMENT (OUTPATIENT)
Dept: ANTEPARTUM | Facility: CLINIC | Age: 42
End: 2022-12-12

## 2022-12-12 PROCEDURE — 76819 FETAL BIOPHYS PROFIL W/O NST: CPT | Mod: 59

## 2022-12-12 PROCEDURE — 76816 OB US FOLLOW-UP PER FETUS: CPT

## 2023-01-04 ENCOUNTER — APPOINTMENT (OUTPATIENT)
Dept: OBGYN | Facility: CLINIC | Age: 43
End: 2023-01-04
Payer: COMMERCIAL

## 2023-01-04 VITALS
RESPIRATION RATE: 18 BRPM | BODY MASS INDEX: 27.14 KG/M2 | HEIGHT: 64 IN | SYSTOLIC BLOOD PRESSURE: 128 MMHG | WEIGHT: 159 LBS | HEART RATE: 92 BPM | DIASTOLIC BLOOD PRESSURE: 83 MMHG | OXYGEN SATURATION: 99 %

## 2023-01-04 PROCEDURE — 0502F SUBSEQUENT PRENATAL CARE: CPT

## 2023-01-09 ENCOUNTER — APPOINTMENT (OUTPATIENT)
Dept: ANTEPARTUM | Facility: CLINIC | Age: 43
End: 2023-01-09
Payer: COMMERCIAL

## 2023-01-09 ENCOUNTER — ASOB RESULT (OUTPATIENT)
Age: 43
End: 2023-01-09

## 2023-01-09 PROCEDURE — 76818 FETAL BIOPHYS PROFILE W/NST: CPT | Mod: 59

## 2023-01-09 PROCEDURE — 76816 OB US FOLLOW-UP PER FETUS: CPT

## 2023-01-11 ENCOUNTER — NON-APPOINTMENT (OUTPATIENT)
Age: 43
End: 2023-01-11

## 2023-01-11 ENCOUNTER — APPOINTMENT (OUTPATIENT)
Dept: OBGYN | Facility: CLINIC | Age: 43
End: 2023-01-11
Payer: COMMERCIAL

## 2023-01-11 VITALS
WEIGHT: 158 LBS | DIASTOLIC BLOOD PRESSURE: 84 MMHG | HEIGHT: 64 IN | HEART RATE: 80 BPM | SYSTOLIC BLOOD PRESSURE: 133 MMHG | BODY MASS INDEX: 26.98 KG/M2

## 2023-01-11 DIAGNOSIS — O10.919 UNSPECIFIED PRE-EXISTING HYPERTENSION COMPLICATING PREGNANCY, UNSPECIFIED TRIMESTER: ICD-10-CM

## 2023-01-11 PROCEDURE — 0502F SUBSEQUENT PRENATAL CARE: CPT

## 2023-01-14 LAB
ALT SERPL-CCNC: 16 U/L
AST SERPL-CCNC: 22 U/L
B-HEM STREP SPEC QL CULT: NORMAL
B-HEM STREP SPEC QL CULT: NORMAL
BASOPHILS # BLD AUTO: 0.05 K/UL
BASOPHILS NFR BLD AUTO: 0.6 %
BUN SERPL-MCNC: 9 MG/DL
CREAT 24H UR-MCNC: 0.6 G/24 H
CREAT ?TM UR-MCNC: 88 MG/DL
CREAT SERPL-MCNC: 0.64 MG/DL
EGFR: 114 ML/MIN/1.73M2
EOSINOPHIL # BLD AUTO: 0.07 K/UL
EOSINOPHIL NFR BLD AUTO: 0.9 %
HCT VFR BLD CALC: 35.2 %
HGB BLD-MCNC: 11.6 G/DL
HIV1+2 AB SPEC QL IA.RAPID: NONREACTIVE
IMM GRANULOCYTES NFR BLD AUTO: 1.1 %
LDH SERPL-CCNC: 240 U/L
LYMPHOCYTES # BLD AUTO: 1.57 K/UL
LYMPHOCYTES NFR BLD AUTO: 19.2 %
MAN DIFF?: NORMAL
MCHC RBC-ENTMCNC: 33 GM/DL
MCHC RBC-ENTMCNC: 33.2 PG
MCV RBC AUTO: 100.9 FL
MONOCYTES # BLD AUTO: 0.71 K/UL
MONOCYTES NFR BLD AUTO: 8.7 %
NEUTROPHILS # BLD AUTO: 5.67 K/UL
NEUTROPHILS NFR BLD AUTO: 69.5 %
PLATELET # BLD AUTO: 236 K/UL
PROT 24H UR-MRATE: 8 MG/DL
PROT ?TM UR-MCNC: 24 HR
PROT UR-MCNC: 56 MG/24 H
RBC # BLD: 3.49 M/UL
RBC # FLD: 13.2 %
SPECIMEN VOL 24H UR: 700 ML
URATE SERPL-MCNC: 5.3 MG/DL
WBC # FLD AUTO: 8.16 K/UL

## 2023-01-18 ENCOUNTER — APPOINTMENT (OUTPATIENT)
Dept: OBGYN | Facility: CLINIC | Age: 43
End: 2023-01-18
Payer: COMMERCIAL

## 2023-01-18 ENCOUNTER — NON-APPOINTMENT (OUTPATIENT)
Age: 43
End: 2023-01-18

## 2023-01-18 VITALS
HEIGHT: 64 IN | OXYGEN SATURATION: 99 % | DIASTOLIC BLOOD PRESSURE: 89 MMHG | WEIGHT: 159 LBS | RESPIRATION RATE: 18 BRPM | SYSTOLIC BLOOD PRESSURE: 137 MMHG | HEART RATE: 96 BPM | BODY MASS INDEX: 27.14 KG/M2

## 2023-01-18 VITALS — HEIGHT: 64 IN

## 2023-01-18 PROCEDURE — 0502F SUBSEQUENT PRENATAL CARE: CPT

## 2023-01-19 ENCOUNTER — APPOINTMENT (OUTPATIENT)
Dept: ANTEPARTUM | Facility: CLINIC | Age: 43
End: 2023-01-19
Payer: COMMERCIAL

## 2023-01-19 ENCOUNTER — ASOB RESULT (OUTPATIENT)
Age: 43
End: 2023-01-19

## 2023-01-19 PROCEDURE — 76818 FETAL BIOPHYS PROFILE W/NST: CPT

## 2023-01-19 PROCEDURE — ZZZZZ: CPT

## 2023-01-24 ENCOUNTER — ASOB RESULT (OUTPATIENT)
Age: 43
End: 2023-01-24

## 2023-01-24 ENCOUNTER — APPOINTMENT (OUTPATIENT)
Dept: ANTEPARTUM | Facility: CLINIC | Age: 43
End: 2023-01-24
Payer: COMMERCIAL

## 2023-01-24 PROCEDURE — 76818 FETAL BIOPHYS PROFILE W/NST: CPT

## 2023-01-25 ENCOUNTER — NON-APPOINTMENT (OUTPATIENT)
Age: 43
End: 2023-01-25

## 2023-01-25 ENCOUNTER — APPOINTMENT (OUTPATIENT)
Dept: OBGYN | Facility: CLINIC | Age: 43
End: 2023-01-25
Payer: COMMERCIAL

## 2023-01-25 VITALS
HEART RATE: 98 BPM | HEIGHT: 64 IN | OXYGEN SATURATION: 98 % | BODY MASS INDEX: 27.14 KG/M2 | SYSTOLIC BLOOD PRESSURE: 138 MMHG | WEIGHT: 159 LBS | RESPIRATION RATE: 18 BRPM | DIASTOLIC BLOOD PRESSURE: 86 MMHG

## 2023-01-25 PROCEDURE — 0502F SUBSEQUENT PRENATAL CARE: CPT

## 2023-01-31 ENCOUNTER — APPOINTMENT (OUTPATIENT)
Dept: ANTEPARTUM | Facility: CLINIC | Age: 43
End: 2023-01-31
Payer: COMMERCIAL

## 2023-01-31 ENCOUNTER — ASOB RESULT (OUTPATIENT)
Age: 43
End: 2023-01-31

## 2023-01-31 ENCOUNTER — APPOINTMENT (OUTPATIENT)
Dept: OBGYN | Facility: CLINIC | Age: 43
End: 2023-01-31
Payer: COMMERCIAL

## 2023-01-31 DIAGNOSIS — Z34.80 ENCOUNTER FOR SUPERVISION OF OTHER NORMAL PREGNANCY, UNSPECIFIED TRIMESTER: ICD-10-CM

## 2023-01-31 DIAGNOSIS — O10.919 UNSPECIFIED PRE-EXISTING HYPERTENSION COMPLICATING PREGNANCY, UNSPECIFIED TRIMESTER: ICD-10-CM

## 2023-01-31 DIAGNOSIS — Z30.09 ENCOUNTER FOR OTHER GENERAL COUNSELING AND ADVICE ON CONTRACEPTION: ICD-10-CM

## 2023-01-31 DIAGNOSIS — Z34.90 ENCOUNTER FOR SUPERVISION OF NORMAL PREGNANCY, UNSPECIFIED, UNSPECIFIED TRIMESTER: ICD-10-CM

## 2023-01-31 DIAGNOSIS — I10 ESSENTIAL (PRIMARY) HYPERTENSION: ICD-10-CM

## 2023-01-31 PROCEDURE — 76818 FETAL BIOPHYS PROFILE W/NST: CPT | Mod: 59

## 2023-01-31 PROCEDURE — 99204 OFFICE O/P NEW MOD 45 MIN: CPT | Mod: 25

## 2023-01-31 PROCEDURE — 76816 OB US FOLLOW-UP PER FETUS: CPT

## 2023-01-31 PROCEDURE — XXXXX: CPT

## 2023-02-01 ENCOUNTER — APPOINTMENT (OUTPATIENT)
Dept: ANTEPARTUM | Facility: CLINIC | Age: 43
End: 2023-02-01

## 2023-02-01 ENCOUNTER — APPOINTMENT (OUTPATIENT)
Dept: OBGYN | Facility: CLINIC | Age: 43
End: 2023-02-01

## 2023-02-01 ENCOUNTER — INPATIENT (INPATIENT)
Facility: HOSPITAL | Age: 43
LOS: 5 days | Discharge: HOME | End: 2023-02-07
Attending: OBSTETRICS & GYNECOLOGY | Admitting: OBSTETRICS & GYNECOLOGY
Payer: COMMERCIAL

## 2023-02-01 VITALS — TEMPERATURE: 98 F | DIASTOLIC BLOOD PRESSURE: 78 MMHG | HEART RATE: 83 BPM | SYSTOLIC BLOOD PRESSURE: 139 MMHG

## 2023-02-01 DIAGNOSIS — O10.013 PRE-EXISTING ESSENTIAL HYPERTENSION COMPLICATING PREGNANCY, THIRD TRIMESTER: ICD-10-CM

## 2023-02-01 LAB
ALBUMIN SERPL ELPH-MCNC: 3.4 G/DL — SIGNIFICANT CHANGE UP (ref 3.3–5)
ALP SERPL-CCNC: 120 U/L — SIGNIFICANT CHANGE UP (ref 40–120)
ALT FLD-CCNC: 14 U/L — SIGNIFICANT CHANGE UP (ref 4–33)
ANION GAP SERPL CALC-SCNC: 13 MMOL/L — SIGNIFICANT CHANGE UP (ref 7–14)
APPEARANCE UR: CLEAR — SIGNIFICANT CHANGE UP
APTT BLD: 30.3 SEC — SIGNIFICANT CHANGE UP (ref 27–36.3)
AST SERPL-CCNC: 22 U/L — SIGNIFICANT CHANGE UP (ref 4–32)
BACTERIA # UR AUTO: NEGATIVE — SIGNIFICANT CHANGE UP
BASOPHILS # BLD AUTO: 0.02 K/UL — SIGNIFICANT CHANGE UP (ref 0–0.2)
BASOPHILS NFR BLD AUTO: 0.3 % — SIGNIFICANT CHANGE UP (ref 0–2)
BILIRUB SERPL-MCNC: 0.3 MG/DL — SIGNIFICANT CHANGE UP (ref 0.2–1.2)
BILIRUB UR-MCNC: NEGATIVE — SIGNIFICANT CHANGE UP
BLD GP AB SCN SERPL QL: NEGATIVE — SIGNIFICANT CHANGE UP
BUN SERPL-MCNC: 9 MG/DL — SIGNIFICANT CHANGE UP (ref 7–23)
CALCIUM SERPL-MCNC: 9.4 MG/DL — SIGNIFICANT CHANGE UP (ref 8.4–10.5)
CHLORIDE SERPL-SCNC: 104 MMOL/L — SIGNIFICANT CHANGE UP (ref 98–107)
CO2 SERPL-SCNC: 19 MMOL/L — LOW (ref 22–31)
COLOR SPEC: SIGNIFICANT CHANGE UP
CREAT ?TM UR-MCNC: 61 MG/DL — SIGNIFICANT CHANGE UP
CREAT SERPL-MCNC: 0.54 MG/DL — SIGNIFICANT CHANGE UP (ref 0.5–1.3)
DIFF PNL FLD: NEGATIVE — SIGNIFICANT CHANGE UP
EGFR: 119 ML/MIN/1.73M2 — SIGNIFICANT CHANGE UP
EOSINOPHIL # BLD AUTO: 0.04 K/UL — SIGNIFICANT CHANGE UP (ref 0–0.5)
EOSINOPHIL NFR BLD AUTO: 0.5 % — SIGNIFICANT CHANGE UP (ref 0–6)
EPI CELLS # UR: 4 /HPF — SIGNIFICANT CHANGE UP (ref 0–5)
FIBRINOGEN PPP-MCNC: 551 MG/DL — HIGH (ref 200–465)
GLUCOSE SERPL-MCNC: 81 MG/DL — SIGNIFICANT CHANGE UP (ref 70–99)
GLUCOSE UR QL: NEGATIVE — SIGNIFICANT CHANGE UP
HCT VFR BLD CALC: 36.5 % — SIGNIFICANT CHANGE UP (ref 34.5–45)
HGB BLD-MCNC: 12.4 G/DL — SIGNIFICANT CHANGE UP (ref 11.5–15.5)
IANC: 5.97 K/UL — SIGNIFICANT CHANGE UP (ref 1.8–7.4)
IMM GRANULOCYTES NFR BLD AUTO: 0.6 % — SIGNIFICANT CHANGE UP (ref 0–0.9)
INR BLD: <0.9 RATIO — SIGNIFICANT CHANGE UP (ref 0.88–1.16)
KETONES UR-MCNC: NEGATIVE — SIGNIFICANT CHANGE UP
LDH SERPL L TO P-CCNC: 234 U/L — HIGH (ref 135–225)
LEUKOCYTE ESTERASE UR-ACNC: ABNORMAL
LYMPHOCYTES # BLD AUTO: 1.28 K/UL — SIGNIFICANT CHANGE UP (ref 1–3.3)
LYMPHOCYTES # BLD AUTO: 16.1 % — SIGNIFICANT CHANGE UP (ref 13–44)
MCHC RBC-ENTMCNC: 32.5 PG — SIGNIFICANT CHANGE UP (ref 27–34)
MCHC RBC-ENTMCNC: 34 GM/DL — SIGNIFICANT CHANGE UP (ref 32–36)
MCV RBC AUTO: 95.5 FL — SIGNIFICANT CHANGE UP (ref 80–100)
MONOCYTES # BLD AUTO: 0.59 K/UL — SIGNIFICANT CHANGE UP (ref 0–0.9)
MONOCYTES NFR BLD AUTO: 7.4 % — SIGNIFICANT CHANGE UP (ref 2–14)
NEUTROPHILS # BLD AUTO: 5.97 K/UL — SIGNIFICANT CHANGE UP (ref 1.8–7.4)
NEUTROPHILS NFR BLD AUTO: 75.1 % — SIGNIFICANT CHANGE UP (ref 43–77)
NITRITE UR-MCNC: NEGATIVE — SIGNIFICANT CHANGE UP
NRBC # BLD: 0 /100 WBCS — SIGNIFICANT CHANGE UP (ref 0–0)
NRBC # FLD: 0 K/UL — SIGNIFICANT CHANGE UP (ref 0–0)
PH UR: 6.5 — SIGNIFICANT CHANGE UP (ref 5–8)
PLATELET # BLD AUTO: 217 K/UL — SIGNIFICANT CHANGE UP (ref 150–400)
POTASSIUM SERPL-MCNC: 4 MMOL/L — SIGNIFICANT CHANGE UP (ref 3.5–5.3)
POTASSIUM SERPL-SCNC: 4 MMOL/L — SIGNIFICANT CHANGE UP (ref 3.5–5.3)
PROT ?TM UR-MCNC: 11 MG/DL — SIGNIFICANT CHANGE UP
PROT SERPL-MCNC: 6.8 G/DL — SIGNIFICANT CHANGE UP (ref 6–8.3)
PROT UR-MCNC: ABNORMAL
PROT/CREAT UR-RTO: 0.2 RATIO — SIGNIFICANT CHANGE UP (ref 0–0.2)
PROTHROM AB SERPL-ACNC: 10.2 SEC — LOW (ref 10.5–13.4)
RBC # BLD: 3.82 M/UL — SIGNIFICANT CHANGE UP (ref 3.8–5.2)
RBC # FLD: 13 % — SIGNIFICANT CHANGE UP (ref 10.3–14.5)
RBC CASTS # UR COMP ASSIST: 2 /HPF — SIGNIFICANT CHANGE UP (ref 0–4)
RH IG SCN BLD-IMP: POSITIVE — SIGNIFICANT CHANGE UP
SODIUM SERPL-SCNC: 136 MMOL/L — SIGNIFICANT CHANGE UP (ref 135–145)
SP GR SPEC: 1.01 — SIGNIFICANT CHANGE UP (ref 1.01–1.05)
URATE SERPL-MCNC: 6.2 MG/DL — SIGNIFICANT CHANGE UP (ref 2.5–7)
UROBILINOGEN FLD QL: SIGNIFICANT CHANGE UP
WBC # BLD: 7.95 K/UL — SIGNIFICANT CHANGE UP (ref 3.8–10.5)
WBC # FLD AUTO: 7.95 K/UL — SIGNIFICANT CHANGE UP (ref 3.8–10.5)
WBC UR QL: 4 /HPF — SIGNIFICANT CHANGE UP (ref 0–5)

## 2023-02-01 RX ORDER — CITRIC ACID/SODIUM CITRATE 300-500 MG
15 SOLUTION, ORAL ORAL EVERY 6 HOURS
Refills: 0 | Status: DISCONTINUED | OUTPATIENT
Start: 2023-02-01 | End: 2023-02-02

## 2023-02-01 RX ORDER — OXYTOCIN 10 UNIT/ML
333.33 VIAL (ML) INJECTION
Qty: 20 | Refills: 0 | Status: COMPLETED | OUTPATIENT
Start: 2023-02-01 | End: 2023-02-01

## 2023-02-01 RX ORDER — CHLORHEXIDINE GLUCONATE 213 G/1000ML
1 SOLUTION TOPICAL ONCE
Refills: 0 | Status: COMPLETED | OUTPATIENT
Start: 2023-02-01 | End: 2023-02-01

## 2023-02-01 RX ORDER — SODIUM CHLORIDE 9 MG/ML
1000 INJECTION, SOLUTION INTRAVENOUS
Refills: 0 | Status: DISCONTINUED | OUTPATIENT
Start: 2023-02-01 | End: 2023-02-02

## 2023-02-01 RX ADMIN — SODIUM CHLORIDE 125 MILLILITER(S): 9 INJECTION, SOLUTION INTRAVENOUS at 20:23

## 2023-02-01 NOTE — OB PROVIDER H&P - HISTORY OF PRESENT ILLNESS
41yo  @ 39+2 GBS neg EFW [id number] presents for a IOL for cHTN & polyhydraminos  denies HA, visual changes, N/V  denies ctx, lof, vb & endorses +FM    PNC otherwise uncomplicated, accepts blood    OBHx: 2011  FT 8#2   GynHx: denies  MedHx: denies  SHx: denies  NKDA  Meds: PNV  Social: denies     VS  T(C): --  HR: --  BP: --  RR: --  SpO2: --    comfortable   abd soft gravid   /mod shanti/+accels/no decels  Dorris irreg  /-3  vtx

## 2023-02-01 NOTE — OB PROVIDER H&P - ATTENDING COMMENTS
Agree with above  P1 at 39w2d scheduled IOL for cHTN and poly  For PO cytotec  Epidural PRN    elizabeth TURNER

## 2023-02-01 NOTE — OB PROVIDER H&P - NSHPLABSRESULTS_GEN_ALL_CORE
P1 @ 39+2 presenting for IOL for cHTN & poly  admit to L&D  routine labs  HELLP labs  IV fluids  will discuss IOL agent with Dr Jona monterroso pa

## 2023-02-02 ENCOUNTER — RESULT REVIEW (OUTPATIENT)
Age: 43
End: 2023-02-02

## 2023-02-02 LAB
ALBUMIN SERPL ELPH-MCNC: 2.7 G/DL — LOW (ref 3.3–5)
ALP SERPL-CCNC: 113 U/L — SIGNIFICANT CHANGE UP (ref 40–120)
ALT FLD-CCNC: 15 U/L — SIGNIFICANT CHANGE UP (ref 4–33)
ANION GAP SERPL CALC-SCNC: 12 MMOL/L — SIGNIFICANT CHANGE UP (ref 7–14)
ANION GAP SERPL CALC-SCNC: 13 MMOL/L — SIGNIFICANT CHANGE UP (ref 7–14)
ANION GAP SERPL CALC-SCNC: 14 MMOL/L — SIGNIFICANT CHANGE UP (ref 7–14)
APTT BLD: 26.9 SEC — LOW (ref 27–36.3)
APTT BLD: 30.5 SEC — SIGNIFICANT CHANGE UP (ref 27–36.3)
APTT BLD: 31.3 SEC — SIGNIFICANT CHANGE UP (ref 27–36.3)
AST SERPL-CCNC: 47 U/L — HIGH (ref 4–32)
BASOPHILS # BLD AUTO: 0.02 K/UL — SIGNIFICANT CHANGE UP (ref 0–0.2)
BASOPHILS NFR BLD AUTO: 0.2 % — SIGNIFICANT CHANGE UP (ref 0–2)
BILIRUB SERPL-MCNC: 0.8 MG/DL — SIGNIFICANT CHANGE UP (ref 0.2–1.2)
BUN SERPL-MCNC: 10 MG/DL — SIGNIFICANT CHANGE UP (ref 7–23)
BUN SERPL-MCNC: 10 MG/DL — SIGNIFICANT CHANGE UP (ref 7–23)
BUN SERPL-MCNC: 9 MG/DL — SIGNIFICANT CHANGE UP (ref 7–23)
CALCIUM SERPL-MCNC: 7.5 MG/DL — LOW (ref 8.4–10.5)
CALCIUM SERPL-MCNC: 8.2 MG/DL — LOW (ref 8.4–10.5)
CALCIUM SERPL-MCNC: 8.4 MG/DL — SIGNIFICANT CHANGE UP (ref 8.4–10.5)
CHLORIDE SERPL-SCNC: 103 MMOL/L — SIGNIFICANT CHANGE UP (ref 98–107)
CHLORIDE SERPL-SCNC: 105 MMOL/L — SIGNIFICANT CHANGE UP (ref 98–107)
CHLORIDE SERPL-SCNC: 106 MMOL/L — SIGNIFICANT CHANGE UP (ref 98–107)
CO2 SERPL-SCNC: 18 MMOL/L — LOW (ref 22–31)
CO2 SERPL-SCNC: 18 MMOL/L — LOW (ref 22–31)
CO2 SERPL-SCNC: 20 MMOL/L — LOW (ref 22–31)
COVID-19 SPIKE DOMAIN AB INTERP: POSITIVE
COVID-19 SPIKE DOMAIN ANTIBODY RESULT: >250 U/ML — HIGH
CREAT SERPL-MCNC: 0.78 MG/DL — SIGNIFICANT CHANGE UP (ref 0.5–1.3)
CREAT SERPL-MCNC: 0.79 MG/DL — SIGNIFICANT CHANGE UP (ref 0.5–1.3)
CREAT SERPL-MCNC: 0.84 MG/DL — SIGNIFICANT CHANGE UP (ref 0.5–1.3)
EGFR: 89 ML/MIN/1.73M2 — SIGNIFICANT CHANGE UP
EGFR: 96 ML/MIN/1.73M2 — SIGNIFICANT CHANGE UP
EGFR: 98 ML/MIN/1.73M2 — SIGNIFICANT CHANGE UP
EOSINOPHIL # BLD AUTO: 0 K/UL — SIGNIFICANT CHANGE UP (ref 0–0.5)
EOSINOPHIL NFR BLD AUTO: 0 % — SIGNIFICANT CHANGE UP (ref 0–6)
FIBRINOGEN PPP-MCNC: 132 MG/DL — LOW (ref 200–465)
FIBRINOGEN PPP-MCNC: 169 MG/DL — LOW (ref 200–465)
FIBRINOGEN PPP-MCNC: 91 MG/DL — CRITICAL LOW (ref 200–465)
GLUCOSE SERPL-MCNC: 76 MG/DL — SIGNIFICANT CHANGE UP (ref 70–99)
GLUCOSE SERPL-MCNC: 77 MG/DL — SIGNIFICANT CHANGE UP (ref 70–99)
GLUCOSE SERPL-MCNC: 81 MG/DL — SIGNIFICANT CHANGE UP (ref 70–99)
HCT VFR BLD CALC: 23.2 % — LOW (ref 34.5–45)
HCT VFR BLD CALC: 27.3 % — LOW (ref 34.5–45)
HCT VFR BLD CALC: 33.8 % — LOW (ref 34.5–45)
HGB BLD-MCNC: 11.1 G/DL — LOW (ref 11.5–15.5)
HGB BLD-MCNC: 7.8 G/DL — LOW (ref 11.5–15.5)
HGB BLD-MCNC: 9.2 G/DL — LOW (ref 11.5–15.5)
IANC: 11.15 K/UL — HIGH (ref 1.8–7.4)
IMM GRANULOCYTES NFR BLD AUTO: 0.5 % — SIGNIFICANT CHANGE UP (ref 0–0.9)
INR BLD: 1 RATIO — SIGNIFICANT CHANGE UP (ref 0.88–1.16)
INR BLD: 1.17 RATIO — HIGH (ref 0.88–1.16)
INR BLD: 1.26 RATIO — HIGH (ref 0.88–1.16)
LACTATE SERPL-SCNC: 2 MMOL/L — SIGNIFICANT CHANGE UP (ref 0.5–2)
LACTATE SERPL-SCNC: 7.4 MMOL/L — CRITICAL HIGH (ref 0.5–2)
LDH SERPL L TO P-CCNC: 435 U/L — HIGH (ref 135–225)
LYMPHOCYTES # BLD AUTO: 0.95 K/UL — LOW (ref 1–3.3)
LYMPHOCYTES # BLD AUTO: 7.4 % — LOW (ref 13–44)
MAGNESIUM SERPL-MCNC: 1.3 MG/DL — LOW (ref 1.6–2.6)
MCHC RBC-ENTMCNC: 31.8 PG — SIGNIFICANT CHANGE UP (ref 27–34)
MCHC RBC-ENTMCNC: 32.4 PG — SIGNIFICANT CHANGE UP (ref 27–34)
MCHC RBC-ENTMCNC: 32.8 GM/DL — SIGNIFICANT CHANGE UP (ref 32–36)
MCHC RBC-ENTMCNC: 33.2 PG — SIGNIFICANT CHANGE UP (ref 27–34)
MCHC RBC-ENTMCNC: 33.6 GM/DL — SIGNIFICANT CHANGE UP (ref 32–36)
MCHC RBC-ENTMCNC: 33.7 GM/DL — SIGNIFICANT CHANGE UP (ref 32–36)
MCV RBC AUTO: 101.2 FL — HIGH (ref 80–100)
MCV RBC AUTO: 94.7 FL — SIGNIFICANT CHANGE UP (ref 80–100)
MCV RBC AUTO: 96.1 FL — SIGNIFICANT CHANGE UP (ref 80–100)
MONOCYTES # BLD AUTO: 0.7 K/UL — SIGNIFICANT CHANGE UP (ref 0–0.9)
MONOCYTES NFR BLD AUTO: 5.4 % — SIGNIFICANT CHANGE UP (ref 2–14)
NEUTROPHILS # BLD AUTO: 11.15 K/UL — HIGH (ref 1.8–7.4)
NEUTROPHILS NFR BLD AUTO: 86.5 % — HIGH (ref 43–77)
NRBC # BLD: 0 /100 WBCS — SIGNIFICANT CHANGE UP (ref 0–0)
NRBC # FLD: 0 K/UL — SIGNIFICANT CHANGE UP (ref 0–0)
PHOSPHATE SERPL-MCNC: 3.2 MG/DL — SIGNIFICANT CHANGE UP (ref 2.5–4.5)
PLATELET # BLD AUTO: 112 K/UL — LOW (ref 150–400)
PLATELET # BLD AUTO: 113 K/UL — LOW (ref 150–400)
PLATELET # BLD AUTO: 176 K/UL — SIGNIFICANT CHANGE UP (ref 150–400)
POTASSIUM SERPL-MCNC: 3.5 MMOL/L — SIGNIFICANT CHANGE UP (ref 3.5–5.3)
POTASSIUM SERPL-MCNC: 3.7 MMOL/L — SIGNIFICANT CHANGE UP (ref 3.5–5.3)
POTASSIUM SERPL-MCNC: 5.4 MMOL/L — HIGH (ref 3.5–5.3)
POTASSIUM SERPL-SCNC: 3.5 MMOL/L — SIGNIFICANT CHANGE UP (ref 3.5–5.3)
POTASSIUM SERPL-SCNC: 3.7 MMOL/L — SIGNIFICANT CHANGE UP (ref 3.5–5.3)
POTASSIUM SERPL-SCNC: 5.4 MMOL/L — HIGH (ref 3.5–5.3)
PROT SERPL-MCNC: 5.5 G/DL — LOW (ref 6–8.3)
PROTHROM AB SERPL-ACNC: 11.6 SEC — SIGNIFICANT CHANGE UP (ref 10.5–13.4)
PROTHROM AB SERPL-ACNC: 13.6 SEC — HIGH (ref 10.5–13.4)
PROTHROM AB SERPL-ACNC: 14.7 SEC — HIGH (ref 10.5–13.4)
RBC # BLD: 2.45 M/UL — LOW (ref 3.8–5.2)
RBC # BLD: 2.84 M/UL — LOW (ref 3.8–5.2)
RBC # BLD: 3.34 M/UL — LOW (ref 3.8–5.2)
RBC # FLD: 13.2 % — SIGNIFICANT CHANGE UP (ref 10.3–14.5)
RBC # FLD: 13.8 % — SIGNIFICANT CHANGE UP (ref 10.3–14.5)
RBC # FLD: 14.3 % — SIGNIFICANT CHANGE UP (ref 10.3–14.5)
SARS-COV-2 IGG+IGM SERPL QL IA: >250 U/ML — HIGH
SARS-COV-2 IGG+IGM SERPL QL IA: POSITIVE
SODIUM SERPL-SCNC: 135 MMOL/L — SIGNIFICANT CHANGE UP (ref 135–145)
SODIUM SERPL-SCNC: 136 MMOL/L — SIGNIFICANT CHANGE UP (ref 135–145)
SODIUM SERPL-SCNC: 138 MMOL/L — SIGNIFICANT CHANGE UP (ref 135–145)
T PALLIDUM AB TITR SER: NEGATIVE — SIGNIFICANT CHANGE UP
URATE SERPL-MCNC: 6.1 MG/DL — SIGNIFICANT CHANGE UP (ref 2.5–7)
WBC # BLD: 12.89 K/UL — HIGH (ref 3.8–10.5)
WBC # BLD: 15.74 K/UL — HIGH (ref 3.8–10.5)
WBC # BLD: 17.1 K/UL — HIGH (ref 3.8–10.5)
WBC # FLD AUTO: 12.89 K/UL — HIGH (ref 3.8–10.5)
WBC # FLD AUTO: 15.74 K/UL — HIGH (ref 3.8–10.5)
WBC # FLD AUTO: 17.1 K/UL — HIGH (ref 3.8–10.5)

## 2023-02-02 PROCEDURE — 59400 OBSTETRICAL CARE: CPT | Mod: U9,UB,GC

## 2023-02-02 PROCEDURE — 88307 TISSUE EXAM BY PATHOLOGIST: CPT | Mod: 26

## 2023-02-02 RX ORDER — OXYTOCIN 10 UNIT/ML
41.67 VIAL (ML) INJECTION
Qty: 20 | Refills: 0 | Status: DISCONTINUED | OUTPATIENT
Start: 2023-02-02 | End: 2023-02-02

## 2023-02-02 RX ORDER — TRANEXAMIC ACID 100 MG/ML
1000 INJECTION, SOLUTION INTRAVENOUS ONCE
Refills: 0 | Status: COMPLETED | OUTPATIENT
Start: 2023-02-02 | End: 2023-02-02

## 2023-02-02 RX ORDER — PRAMOXINE HYDROCHLORIDE 150 MG/15G
1 AEROSOL, FOAM RECTAL EVERY 4 HOURS
Refills: 0 | Status: DISCONTINUED | OUTPATIENT
Start: 2023-02-02 | End: 2023-02-07

## 2023-02-02 RX ORDER — DIPHENHYDRAMINE HCL 50 MG
25 CAPSULE ORAL ONCE
Refills: 0 | Status: COMPLETED | OUTPATIENT
Start: 2023-02-02 | End: 2023-02-02

## 2023-02-02 RX ORDER — ACETAMINOPHEN 500 MG
1000 TABLET ORAL ONCE
Refills: 0 | Status: COMPLETED | OUTPATIENT
Start: 2023-02-02 | End: 2023-02-02

## 2023-02-02 RX ORDER — ACETAMINOPHEN 500 MG
975 TABLET ORAL
Refills: 0 | Status: DISCONTINUED | OUTPATIENT
Start: 2023-02-02 | End: 2023-02-04

## 2023-02-02 RX ORDER — SIMETHICONE 80 MG/1
80 TABLET, CHEWABLE ORAL EVERY 4 HOURS
Refills: 0 | Status: DISCONTINUED | OUTPATIENT
Start: 2023-02-02 | End: 2023-02-07

## 2023-02-02 RX ORDER — SODIUM CHLORIDE 9 MG/ML
3 INJECTION INTRAMUSCULAR; INTRAVENOUS; SUBCUTANEOUS EVERY 8 HOURS
Refills: 0 | Status: DISCONTINUED | OUTPATIENT
Start: 2023-02-02 | End: 2023-02-07

## 2023-02-02 RX ORDER — MAGNESIUM HYDROXIDE 400 MG/1
30 TABLET, CHEWABLE ORAL
Refills: 0 | Status: DISCONTINUED | OUTPATIENT
Start: 2023-02-02 | End: 2023-02-07

## 2023-02-02 RX ORDER — DIPHENHYDRAMINE HCL 50 MG
25 CAPSULE ORAL EVERY 6 HOURS
Refills: 0 | Status: DISCONTINUED | OUTPATIENT
Start: 2023-02-02 | End: 2023-02-07

## 2023-02-02 RX ORDER — OXYCODONE HYDROCHLORIDE 5 MG/1
5 TABLET ORAL ONCE
Refills: 0 | Status: DISCONTINUED | OUTPATIENT
Start: 2023-02-02 | End: 2023-02-07

## 2023-02-02 RX ORDER — MAGNESIUM SULFATE 500 MG/ML
2 VIAL (ML) INJECTION ONCE
Refills: 0 | Status: COMPLETED | OUTPATIENT
Start: 2023-02-02 | End: 2023-02-02

## 2023-02-02 RX ORDER — BENZOCAINE 10 %
1 GEL (GRAM) MUCOUS MEMBRANE EVERY 6 HOURS
Refills: 0 | Status: DISCONTINUED | OUTPATIENT
Start: 2023-02-02 | End: 2023-02-07

## 2023-02-02 RX ORDER — SODIUM CHLORIDE 9 MG/ML
1000 INJECTION, SOLUTION INTRAVENOUS ONCE
Refills: 0 | Status: COMPLETED | OUTPATIENT
Start: 2023-02-02 | End: 2023-02-02

## 2023-02-02 RX ORDER — CITRIC ACID/SODIUM CITRATE 300-500 MG
15 SOLUTION, ORAL ORAL EVERY 6 HOURS
Refills: 0 | Status: DISCONTINUED | OUTPATIENT
Start: 2023-02-02 | End: 2023-02-02

## 2023-02-02 RX ORDER — AER TRAVELER 0.5 G/1
1 SOLUTION RECTAL; TOPICAL EVERY 4 HOURS
Refills: 0 | Status: DISCONTINUED | OUTPATIENT
Start: 2023-02-02 | End: 2023-02-07

## 2023-02-02 RX ORDER — IBUPROFEN 200 MG
600 TABLET ORAL EVERY 6 HOURS
Refills: 0 | Status: DISCONTINUED | OUTPATIENT
Start: 2023-02-02 | End: 2023-02-02

## 2023-02-02 RX ORDER — SODIUM CHLORIDE 9 MG/ML
1000 INJECTION, SOLUTION INTRAVENOUS
Refills: 0 | Status: DISCONTINUED | OUTPATIENT
Start: 2023-02-02 | End: 2023-02-02

## 2023-02-02 RX ORDER — SODIUM CHLORIDE 9 MG/ML
1000 INJECTION, SOLUTION INTRAVENOUS
Refills: 0 | Status: DISCONTINUED | OUTPATIENT
Start: 2023-02-02 | End: 2023-02-03

## 2023-02-02 RX ORDER — ERTAPENEM SODIUM 1 G/1
1000 INJECTION, POWDER, LYOPHILIZED, FOR SOLUTION INTRAMUSCULAR; INTRAVENOUS EVERY 24 HOURS
Refills: 0 | Status: DISCONTINUED | OUTPATIENT
Start: 2023-02-02 | End: 2023-02-03

## 2023-02-02 RX ORDER — TETANUS TOXOID, REDUCED DIPHTHERIA TOXOID AND ACELLULAR PERTUSSIS VACCINE, ADSORBED 5; 2.5; 8; 8; 2.5 [IU]/.5ML; [IU]/.5ML; UG/.5ML; UG/.5ML; UG/.5ML
0.5 SUSPENSION INTRAMUSCULAR ONCE
Refills: 0 | Status: DISCONTINUED | OUTPATIENT
Start: 2023-02-02 | End: 2023-02-07

## 2023-02-02 RX ORDER — LANOLIN
1 OINTMENT (GRAM) TOPICAL EVERY 6 HOURS
Refills: 0 | Status: DISCONTINUED | OUTPATIENT
Start: 2023-02-02 | End: 2023-02-07

## 2023-02-02 RX ORDER — POTASSIUM CHLORIDE 20 MEQ
20 PACKET (EA) ORAL ONCE
Refills: 0 | Status: COMPLETED | OUTPATIENT
Start: 2023-02-02 | End: 2023-02-02

## 2023-02-02 RX ORDER — DIBUCAINE 1 %
1 OINTMENT (GRAM) RECTAL EVERY 6 HOURS
Refills: 0 | Status: DISCONTINUED | OUTPATIENT
Start: 2023-02-02 | End: 2023-02-07

## 2023-02-02 RX ORDER — FIBRINOGEN 900-1300MG
1000 VIAL (EA) INTRAVENOUS ONCE
Refills: 0 | Status: DISCONTINUED | OUTPATIENT
Start: 2023-02-02 | End: 2023-02-02

## 2023-02-02 RX ORDER — DIPHENOXYLATE HCL/ATROPINE 2.5-.025MG
2 TABLET ORAL ONCE
Refills: 0 | Status: DISCONTINUED | OUTPATIENT
Start: 2023-02-02 | End: 2023-02-05

## 2023-02-02 RX ORDER — CARBOPROST TROMETHAMINE 250 UG/ML
250 INJECTION, SOLUTION INTRAMUSCULAR ONCE
Refills: 0 | Status: COMPLETED | OUTPATIENT
Start: 2023-02-02 | End: 2023-02-02

## 2023-02-02 RX ORDER — KETOROLAC TROMETHAMINE 30 MG/ML
30 SYRINGE (ML) INJECTION ONCE
Refills: 0 | Status: DISCONTINUED | OUTPATIENT
Start: 2023-02-02 | End: 2023-02-02

## 2023-02-02 RX ORDER — OXYCODONE HYDROCHLORIDE 5 MG/1
5 TABLET ORAL
Refills: 0 | Status: DISCONTINUED | OUTPATIENT
Start: 2023-02-02 | End: 2023-02-07

## 2023-02-02 RX ORDER — HYDROCORTISONE 1 %
1 OINTMENT (GRAM) TOPICAL EVERY 6 HOURS
Refills: 0 | Status: DISCONTINUED | OUTPATIENT
Start: 2023-02-02 | End: 2023-02-07

## 2023-02-02 RX ORDER — SODIUM CHLORIDE 9 MG/ML
500 INJECTION, SOLUTION INTRAVENOUS ONCE
Refills: 0 | Status: COMPLETED | OUTPATIENT
Start: 2023-02-02 | End: 2023-02-02

## 2023-02-02 RX ADMIN — Medication 400 MILLIGRAM(S): at 12:47

## 2023-02-02 RX ADMIN — SODIUM CHLORIDE 3 MILLILITER(S): 9 INJECTION INTRAMUSCULAR; INTRAVENOUS; SUBCUTANEOUS at 21:33

## 2023-02-02 RX ADMIN — SODIUM CHLORIDE 1000 MILLILITER(S): 9 INJECTION, SOLUTION INTRAVENOUS at 03:58

## 2023-02-02 RX ADMIN — Medication 125 MILLIUNIT(S)/MIN: at 13:40

## 2023-02-02 RX ADMIN — CARBOPROST TROMETHAMINE 250 MICROGRAM(S): 250 INJECTION, SOLUTION INTRAMUSCULAR at 11:53

## 2023-02-02 RX ADMIN — Medication 400 MILLIGRAM(S): at 20:53

## 2023-02-02 RX ADMIN — Medication 1000 MILLIGRAM(S): at 13:17

## 2023-02-02 RX ADMIN — SODIUM CHLORIDE 1000 MILLILITER(S): 9 INJECTION, SOLUTION INTRAVENOUS at 12:00

## 2023-02-02 RX ADMIN — SODIUM CHLORIDE 125 MILLILITER(S): 9 INJECTION, SOLUTION INTRAVENOUS at 07:30

## 2023-02-02 RX ADMIN — Medication 1000 MILLIUNIT(S)/MIN: at 11:39

## 2023-02-02 RX ADMIN — SODIUM CHLORIDE 75 MILLILITER(S): 9 INJECTION, SOLUTION INTRAVENOUS at 19:48

## 2023-02-02 RX ADMIN — Medication 1000 MILLIGRAM(S): at 21:10

## 2023-02-02 RX ADMIN — Medication 25 MILLIGRAM(S): at 12:43

## 2023-02-02 RX ADMIN — SODIUM CHLORIDE 3 MILLILITER(S): 9 INJECTION INTRAMUSCULAR; INTRAVENOUS; SUBCUTANEOUS at 14:00

## 2023-02-02 RX ADMIN — TRANEXAMIC ACID 220 MILLIGRAM(S): 100 INJECTION, SOLUTION INTRAVENOUS at 11:50

## 2023-02-02 RX ADMIN — Medication 25 GRAM(S): at 19:48

## 2023-02-02 RX ADMIN — ERTAPENEM SODIUM 120 MILLIGRAM(S): 1 INJECTION, POWDER, LYOPHILIZED, FOR SOLUTION INTRAMUSCULAR; INTRAVENOUS at 12:44

## 2023-02-02 RX ADMIN — Medication 20 MILLIEQUIVALENT(S): at 20:53

## 2023-02-02 NOTE — OB POSTPARTUM EVENT NOTE - NS_EVENTSUMMARY4_OBGYN_ALL_OB_FT
- repeat labs reviewed: H/H 9.2/27.3, Plt 112, Fibrinogen 91 (prior to receiving Cryo), INR 1.26  - AC suction discontinued and cervical seal balloon deflated; total 100cc evacuated w/AC

## 2023-02-02 NOTE — OB NEONATOLOGY/PEDIATRICIAN DELIVERY SUMMARY - NSPEDSNEONOTESA_OBGYN_ALL_OB_FT
Pediatrician called to delivery for meconium-stained fluids. Male infant born at 39 1/7 wks via  to a 42 y/o  blood type O+ mother. Maternal history of cHTN. Prenatal history of polyhydramnios. Suspected placental abruption post-partum w/ hemorrhaging. Prenatal labs nr/immune/-, GBS - on . Covid neg. SROM at 06:47 on  with meconium fluids. EOS score 4.48, highest maternal temperature 39.4 (<1hr post-partum). Baby emerged stunned and cyanotic, weak cry with stim. Cord clamping delayed 30sec. Infant was brought to radiant warmer and warmed, dried, stimulated and suctioned. HR>100, respiratory effort improved with suction. APGARS of 7/9. Mom is initiating breast feeding. Consents to Hepatitis B vaccination. Desires for infant to be circumcised.

## 2023-02-02 NOTE — PROGRESS NOTE ADULT - SUBJECTIVE AND OBJECTIVE BOX
Called to assist OB team for PPH. Upon arrival Sindy being inserted for atony. Tocolytics & TXA being adminstered. Pt with one IV running and failed attempts at 2nd. 1 att 18G L hand unsuccesful, 1 att R hand 20G successful, labs sent and ster secured. Albumin transfused while waiting for PRBCs. 1u PRBC and 500ml albumin infused...bleeding controlled as per OB. Pt remains tachycardic however HRs decreased from 170s to 140s to 120s. Normotensive throughout confirmed by manual NIBP. OB transfusing cryo for hypofibrinogenemia. Will reconsult anesth for any changes. Called to assist OB team for PPH. Upon arrival Sindy being inserted for atony. Uterotonics & TXA being adminstered. Pt with one IV running and failed attempts at 2nd. 1 att 18G L hand unsuccesful, 1 att R hand 20G successful, labs sent and ster secured. Albumin transfused while waiting for PRBCs. 1u PRBC and 500ml albumin infused...bleeding controlled as per OB. Pt remains tachycardic however HRs decreased from 170s to 140s to 120s. Normotensive throughout confirmed by manual NIBP. OB transfusing cryo for hypofibrinogenemia. Will reconsult anesth for any changes.

## 2023-02-02 NOTE — OB POSTPARTUM EVENT NOTE - NS_EVENTFINDINGS4_OBGYN_ALL_OB_FT
- monitor AC off suction  - administer products (Cryo, pRBC)  - discussed FFP vs riastap w/anesthesia -> 2/2 prolonged PT, will administer FFP

## 2023-02-02 NOTE — CONSULT NOTE ADULT - ATTENDING COMMENTS
I agree with the history, physical, and plan, which I have reviewed and edited where appropriate.  I agree with notes/assessment of health care providers on my service.  I have personally examined the patient.  I was physically present for the key portions of the evaluation and management (E/M) service provided.  I reviewed data and laboratory tests/x-rays and all pertinent electronic images.  The patient is a critical care patient with life threatening hemodynamic and metabolic instability in SICU.  Risk benefit analyses discussed.    The patient is in SICU with diagnosis mentioned in the note.    The plan is specified below.    43 y/o F s/p vaginal delivery with meconium staining and uterine atony, concerning for endometritis. Bleeding improved with AC suction device. Pt with DIC, improving after transfusion of cryoprecipitate. Admitted to SICU for correction of DIC and hemorrhage watch.     NEURO:  -Pain control with standing Tylenol, PRN Oxycodone  - hold NSAIDs until CBC stable    RESPIRATORY:  -on 2L NC     CARDIOVASCULAR:  - volume resuscitate as needed     GASTROINTESTINAL:  -NPO, advance to clears if h/h stable     RENAL:  -Barajas  - LR @ 75    HEME: DIC, post-partum hemorrhage   - trend fibrinogen and cbc  -Transfuse as necessary  -Hold DVT ppx    ID: concern for endometritis   -Ertapenem     ENDO:  -No needs

## 2023-02-02 NOTE — OB POSTPARTUM EVENT NOTE - NS_EVENTSUMMARY5_OBGYN_ALL_OB_FT
- Pt accepted to SICU  - Chux changed -   - AC attached to suction  - will finish administration of 2nd Cryo here

## 2023-02-02 NOTE — OB POSTPARTUM EVENT NOTE - NS_EVENTPTSUMMARY1_OBGYN_ALL_OB_FT
T(C): 36.8 (02 Feb 2023 11:00), Max: 36.9 (01 Feb 2023 17:30)  T(F): 98.2 (02 Feb 2023 11:00), Max: 98.42 (01 Feb 2023 17:30)  HR: 142 (02 Feb 2023 12:29) (39 - 179)  BP: 206/84 (02 Feb 2023 12:14) (109/61 - 206/84)  RR: 20 (02 Feb 2023 11:00) (14 - 20)  SpO2: 90% (02 Feb 2023 12:29) (73% - 100%)

## 2023-02-02 NOTE — CONSULT NOTE ADULT - SUBJECTIVE AND OBJECTIVE BOX
HISTORY:    41 y/o F w/ no significant PMH/PSH is now s/p vaginal delivery of healthy full term child after labor induction at 39 weeks and 2 days. The patient's pregnancy was complicated only by gestational hypertension. She delivered at 11AM this morning however intra op uterine atony was noted and unrelieved by Pitocin, hemabate. Sindy balloon was placed. 1U PRBC given, 1L albumin, 1L crystalloid, 4 cryo packs.     At time of visit in patient room, vital signs are stable with the exception of tachycardia to 115. Labs significant for 1 unit drop in Hgb from baseline 12.5 Platelets wnl, coags wnl, fibrinogen decreased from 500 to 150 pre op. Lactate 7.9. Mild leakage of lochia noted at bedside. Sindy with 80cc of blood, suction at 80mm Hg.     SICU is consulted for hemorrhage watch i/s/o elevated lactate.     NEURO  RASS (if intubated): 		CAM ICU (if concern for delirium):  Exam: AOX3. Sleepy but responsive  Meds: acetaminophen     Tablet .. 975 milliGRAM(s) Oral <User Schedule>  diphenhydrAMINE 25 milliGRAM(s) Oral every 6 hours PRN Pruritus  ibuprofen  Tablet. 600 milliGRAM(s) Oral every 6 hours  ketorolac   Injectable 30 milliGRAM(s) IV Push once  oxyCODONE    IR 5 milliGRAM(s) Oral every 3 hours PRN Moderate to Severe Pain (4-10)  oxyCODONE    IR 5 milliGRAM(s) Oral once PRN Moderate to Severe Pain (4-10)      RESPIRATORY  RR: 20 (02-02-23 @ 11:00) (14 - 20)  SpO2: 96% (02-02-23 @ 13:54) (73% - 100%)  Wt(kg): --  Exam: On non rebreather mask at time of visit, saturating well  Mechanical Ventilation:   ABG - ( 02 Feb 2023 12:22 )  pH: x     /  pCO2: x     /  pO2: x     / HCO3: x     / Base Excess: x     /  SaO2: x       Lactate: 7.4              Meds:     CARDIOVASCULAR  HR: 115 (02-02-23 @ 13:54) (39 - 179)  BP: 132/- (02-02-23 @ 13:59) (109/61 - 206/84)  BP(mean): --  ABP: --  ABP(mean): --  Wt(kg): --  CVP(cm H2O): --    Lactate, Blood: 7.4 mmol/L (02-02 @ 12:22)    Exam:   Cardiac Rhythm:   Perfusion     [ ]Adequate   [X ]Inadequate  Mentation   [X ]Normal       [ ]Reduced  Extremities  [X ]Warm         [ ]Cool  Volume Status [ ]Hypervolemic [X ]Euvolemic [ ]Hypovolemic  Meds:     GI/NUTRITION  Exam:   Diet: NPO. Abdomen with fundus 10cm above umbilicus, firm. No abdominal pain on palpation. No surgical incisions noted  Meds: diphenoxylate/atropine 2 Tablet(s) Oral once  magnesium hydroxide Suspension 30 milliLiter(s) Oral two times a day PRN Constipation  simethicone 80 milliGRAM(s) Chew every 4 hours PRN Gas      GENITOURINARY:    Exam: Barajas with mild blood tinged urine    I&O's Detail    02-01 @ 07:01  -  02-02 @ 07:00  --------------------------------------------------------  IN:    Lactated Ringers: 1500 mL    Lactated Ringers Bolus: 1000 mL  Total IN: 2500 mL    OUT:    Indwelling Catheter - Urethral (mL): 240 mL  Total OUT: 240 mL    Total NET: 2260 mL      02-02 @ 07:01  -  02-02 @ 13:59  --------------------------------------------------------  IN:    Lactated Ringers: 125 mL  Total IN: 125 mL    OUT:  Total OUT: 0 mL    Total NET: 125 mL        Weight (kg): 72.1 (02-01 @ 17:59)  02-02    135  |  103  |  9   ----------------------------<  77  5.4<H>   |  18<L>  |  0.84    Ca    8.4      02 Feb 2023 12:22    TPro  5.5<L>  /  Alb  2.7<L>  /  TBili  0.8  /  DBili  x   /  AST  47<H>  /  ALT  15  /  AlkPhos  113  02-02    Meds: carboprost Injectable 250 MICROGram(s) IntraMuscular once  lactated ringers Bolus 1000 milliLiter(s) IV Bolus once  oxytocin Infusion 41.667 milliUNIT(s)/Min IV Continuous <Continuous>  prenatal multivitamin 1 Tablet(s) Oral daily  sodium chloride 0.9% lock flush 3 milliLiter(s) IV Push every 8 hours      HEMATOLOGIC  Meds:                         11.1   12.89 )-----------( 176      ( 02 Feb 2023 12:22 )             33.8     PT/INR - ( 02 Feb 2023 12:22 )   PT: 11.6 sec;   INR: 1.00 ratio         PTT - ( 02 Feb 2023 12:22 )  PTT:26.9 sec    INFECTIOUS DISEASES  T(C): 36.8 (02-02-23 @ 11:00), Max: 36.9 (02-01-23 @ 17:30)  Wt(kg): --  WBC Count: 12.89 K/uL (02-02 @ 12:22)  WBC Count: 7.95 K/uL (02-01 @ 18:00)    Recent Cultures:    Meds: diphtheria/tetanus/pertussis (acellular) Vaccine (Adacel) 0.5 milliLiter(s) IntraMuscular once  ertapenem  IVPB 1000 milliGRAM(s) IV Intermittent every 24 hours      ENDOCRINE  Capillary Blood Glucose    Meds:     ACCESS DEVICES:  [ ] Peripheral IV  [ ] Central Venous Line		[ ] R	[ ] L	[ ] IJ	[ ] Fem	[ ] SC	Placed:   [ ] Arterial Line			[ ] R	[ ] L	[ ] Fem	[ ] Rad	[ ] Ax	Placed:   [ ] PICC:					[ ] Mediport  [ ] Urinary Catheter, Date Placed:   [ ] Necessity of urinary, arterial, and venous catheters discussed    OTHER MEDICATIONS:  benzocaine 20%/menthol 0.5% Spray 1 Spray(s) Topical every 6 hours PRN  dibucaine 1% Ointment 1 Application(s) Topical every 6 hours PRN  hydrocortisone 1% Cream 1 Application(s) Topical every 6 hours PRN  lanolin Ointment 1 Application(s) Topical every 6 hours PRN  pramoxine 1%/zinc 5% Cream 1 Application(s) Topical every 4 hours PRN  witch hazel Pads 1 Application(s) Topical every 4 hours PRN      IMAGING: HISTORY:    43 y/o F w/ no significant PMH/PSH is now s/p vaginal delivery of healthy full term child after labor induction at 39 weeks and 2 days. The patient's pregnancy was complicated only by gestational hypertension. She delivered at 11AM this morning however intra op uterine atony was noted and unrelieved by Pitocin, hemabate. Sindy balloon was placed. 1U PRBC given, 1L albumin, 1L crystalloid, 4 cryo packs.     At time of visit in patient room, vital signs are stable with the exception of tachycardia to 115. Labs significant for 1 unit drop in Hgb from baseline 12.5 Platelets wnl, coags wnl, fibrinogen decreased from 500 to 150 pre op. Lactate 7.9. Mild leakage of lochia noted at bedside. Sindy with 80cc of blood, suction at 80mm Hg. Epidural in place.    SICU is consulted for hemorrhage watch i/s/o elevated lactate.     NEURO  RASS (if intubated): 		CAM ICU (if concern for delirium):  Exam: AOX3. Sleepy but responsive  Meds: acetaminophen     Tablet .. 975 milliGRAM(s) Oral <User Schedule>  diphenhydrAMINE 25 milliGRAM(s) Oral every 6 hours PRN Pruritus  ibuprofen  Tablet. 600 milliGRAM(s) Oral every 6 hours  ketorolac   Injectable 30 milliGRAM(s) IV Push once  oxyCODONE    IR 5 milliGRAM(s) Oral every 3 hours PRN Moderate to Severe Pain (4-10)  oxyCODONE    IR 5 milliGRAM(s) Oral once PRN Moderate to Severe Pain (4-10)      RESPIRATORY  RR: 20 (02-02-23 @ 11:00) (14 - 20)  SpO2: 96% (02-02-23 @ 13:54) (73% - 100%)  Wt(kg): --  Exam: On non rebreather mask at time of visit, saturating well  Mechanical Ventilation:   ABG - ( 02 Feb 2023 12:22 )  pH: x     /  pCO2: x     /  pO2: x     / HCO3: x     / Base Excess: x     /  SaO2: x       Lactate: 7.4              Meds:     CARDIOVASCULAR  HR: 115 (02-02-23 @ 13:54) (39 - 179)  BP: 132/- (02-02-23 @ 13:59) (109/61 - 206/84)  BP(mean): --  ABP: --  ABP(mean): --  Wt(kg): --  CVP(cm H2O): --    Lactate, Blood: 7.4 mmol/L (02-02 @ 12:22)    Exam:   Cardiac Rhythm:   Perfusion     [ ]Adequate   [X ]Inadequate  Mentation   [X ]Normal       [ ]Reduced  Extremities  [X ]Warm         [ ]Cool  Volume Status [ ]Hypervolemic [X ]Euvolemic [ ]Hypovolemic  Meds:     GI/NUTRITION  Exam:   Diet: NPO. Abdomen with fundus 10cm above umbilicus, firm. No abdominal pain on palpation. No surgical incisions noted  Meds: diphenoxylate/atropine 2 Tablet(s) Oral once  magnesium hydroxide Suspension 30 milliLiter(s) Oral two times a day PRN Constipation  simethicone 80 milliGRAM(s) Chew every 4 hours PRN Gas      GENITOURINARY:    Exam: [place] with mild blood tinged urine    I&O's Detail    02-01 @ 07:01  -  02-02 @ 07:00  --------------------------------------------------------  IN:    Lactated Ringers: 1500 mL    Lactated Ringers Bolus: 1000 mL  Total IN: 2500 mL    OUT:    Indwelling Catheter - Urethral (mL): 240 mL  Total OUT: 240 mL    Total NET: 2260 mL      02-02 @ 07:01  -  02-02 @ 13:59  --------------------------------------------------------  IN:    Lactated Ringers: 125 mL  Total IN: 125 mL    OUT:  Total OUT: 0 mL    Total NET: 125 mL        Weight (kg): 72.1 (02-01 @ 17:59)  02-02    135  |  103  |  9   ----------------------------<  77  5.4<H>   |  18<L>  |  0.84    Ca    8.4      02 Feb 2023 12:22    TPro  5.5<L>  /  Alb  2.7<L>  /  TBili  0.8  /  DBili  x   /  AST  47<H>  /  ALT  15  /  AlkPhos  113  02-02    Meds: carboprost Injectable 250 MICROGram(s) IntraMuscular once  lactated ringers Bolus 1000 milliLiter(s) IV Bolus once  oxytocin Infusion 41.667 milliUNIT(s)/Min IV Continuous <Continuous>  prenatal multivitamin 1 Tablet(s) Oral daily  sodium chloride 0.9% lock flush 3 milliLiter(s) IV Push every 8 hours      HEMATOLOGIC  Meds:                         11.1   12.89 )-----------( 176      ( 02 Feb 2023 12:22 )             33.8     PT/INR - ( 02 Feb 2023 12:22 )   PT: 11.6 sec;   INR: 1.00 ratio         PTT - ( 02 Feb 2023 12:22 )  PTT:26.9 sec    INFECTIOUS DISEASES  T(C): 36.8 (02-02-23 @ 11:00), Max: 36.9 (02-01-23 @ 17:30)  Wt(kg): --  WBC Count: 12.89 K/uL (02-02 @ 12:22)  WBC Count: 7.95 K/uL (02-01 @ 18:00)    Recent Cultures:    Meds: diphtheria/tetanus/pertussis (acellular) Vaccine (Adacel) 0.5 milliLiter(s) IntraMuscular once  ertapenem  IVPB 1000 milliGRAM(s) IV Intermittent every 24 hours      ENDOCRINE  Capillary Blood Glucose    Meds:     ACCESS DEVICES:  [ ] Peripheral IV  [ ] Central Venous Line		[ ] R	[ ] L	[ ] IJ	[ ] Fem	[ ] SC	Placed:   [ ] Arterial Line			[ ] R	[ ] L	[ ] Fem	[ ] Rad	[ ] Ax	Placed:   [ ] PICC:					[ ] Mediport  [ ] Urinary Catheter, Date Placed:   [ ] Necessity of urinary, arterial, and venous catheters discussed    OTHER MEDICATIONS:  benzocaine 20%/menthol 0.5% Spray 1 Spray(s) Topical every 6 hours PRN  dibucaine 1% Ointment 1 Application(s) Topical every 6 hours PRN  hydrocortisone 1% Cream 1 Application(s) Topical every 6 hours PRN  lanolin Ointment 1 Application(s) Topical every 6 hours PRN  pramoxine 1%/zinc 5% Cream 1 Application(s) Topical every 4 hours PRN  witch hazel Pads 1 Application(s) Topical every 4 hours PRN      IMAGING: HISTORY:    41 y/o F w/ no significant PMH/PSH is now s/p vaginal delivery of healthy full term child after labor induction at 39 weeks and 2 days. The patient's pregnancy was complicated only by gestational hypertension. She delivered at 11AM this morning however intra op uterine atony was noted and unrelieved by Pitocin, hemabate. Sindy balloon was placed. 1U PRBC given, 1L albumin, 1L crystalloid, 4 cryo packs, TXA.     At time of visit in patient room, vital signs are stable with the exception of tachycardia to 115. Labs significant for 1 unit drop in Hgb from baseline 12.5 Platelets wnl, coags wnl, fibrinogen decreased from 500 to 150 pre op. Lactate 7.9. Mild leakage of lochia noted at bedside. Sindy with 80cc of blood, suction at 80mm Hg. Epidural in place.    SICU is consulted for hemorrhage watch i/s/o elevated lactate.     NEURO  RASS (if intubated): 		CAM ICU (if concern for delirium):  Exam: AOX3. Sleepy but responsive  Meds: acetaminophen     Tablet .. 975 milliGRAM(s) Oral <User Schedule>  diphenhydrAMINE 25 milliGRAM(s) Oral every 6 hours PRN Pruritus  ibuprofen  Tablet. 600 milliGRAM(s) Oral every 6 hours  ketorolac   Injectable 30 milliGRAM(s) IV Push once  oxyCODONE    IR 5 milliGRAM(s) Oral every 3 hours PRN Moderate to Severe Pain (4-10)  oxyCODONE    IR 5 milliGRAM(s) Oral once PRN Moderate to Severe Pain (4-10)      RESPIRATORY  RR: 20 (02-02-23 @ 11:00) (14 - 20)  SpO2: 96% (02-02-23 @ 13:54) (73% - 100%)  Wt(kg): --  Exam: On non rebreather mask at time of visit, saturating well  Mechanical Ventilation:   ABG - ( 02 Feb 2023 12:22 )  pH: x     /  pCO2: x     /  pO2: x     / HCO3: x     / Base Excess: x     /  SaO2: x       Lactate: 7.4              Meds:     CARDIOVASCULAR  HR: 115 (02-02-23 @ 13:54) (39 - 179)  BP: 132/- (02-02-23 @ 13:59) (109/61 - 206/84)  BP(mean): --  ABP: --  ABP(mean): --  Wt(kg): --  CVP(cm H2O): --    Lactate, Blood: 7.4 mmol/L (02-02 @ 12:22)    Exam:   Cardiac Rhythm:   Perfusion     [ ]Adequate   [X ]Inadequate  Mentation   [X ]Normal       [ ]Reduced  Extremities  [X ]Warm         [ ]Cool  Volume Status [ ]Hypervolemic [X ]Euvolemic [ ]Hypovolemic  Meds:     GI/NUTRITION  Exam:   Diet: NPO. Abdomen with fundus 10cm above umbilicus, firm. No abdominal pain on palpation. No surgical incisions noted  Meds: diphenoxylate/atropine 2 Tablet(s) Oral once  magnesium hydroxide Suspension 30 milliLiter(s) Oral two times a day PRN Constipation  simethicone 80 milliGRAM(s) Chew every 4 hours PRN Gas      GENITOURINARY:    Exam: Barajas with mild blood tinged urine    I&O's Detail    02-01 @ 07:01  -  02-02 @ 07:00  --------------------------------------------------------  IN:    Lactated Ringers: 1500 mL    Lactated Ringers Bolus: 1000 mL  Total IN: 2500 mL    OUT:    Indwelling Catheter - Urethral (mL): 240 mL  Total OUT: 240 mL    Total NET: 2260 mL      02-02 @ 07:01  -  02-02 @ 13:59  --------------------------------------------------------  IN:    Lactated Ringers: 125 mL  Total IN: 125 mL    OUT:  Total OUT: 0 mL    Total NET: 125 mL        Weight (kg): 72.1 (02-01 @ 17:59)  02-02    135  |  103  |  9   ----------------------------<  77  5.4<H>   |  18<L>  |  0.84    Ca    8.4      02 Feb 2023 12:22    TPro  5.5<L>  /  Alb  2.7<L>  /  TBili  0.8  /  DBili  x   /  AST  47<H>  /  ALT  15  /  AlkPhos  113  02-02    Meds: carboprost Injectable 250 MICROGram(s) IntraMuscular once  lactated ringers Bolus 1000 milliLiter(s) IV Bolus once  oxytocin Infusion 41.667 milliUNIT(s)/Min IV Continuous <Continuous>  prenatal multivitamin 1 Tablet(s) Oral daily  sodium chloride 0.9% lock flush 3 milliLiter(s) IV Push every 8 hours      HEMATOLOGIC  Meds:                         11.1   12.89 )-----------( 176      ( 02 Feb 2023 12:22 )             33.8     PT/INR - ( 02 Feb 2023 12:22 )   PT: 11.6 sec;   INR: 1.00 ratio         PTT - ( 02 Feb 2023 12:22 )  PTT:26.9 sec    INFECTIOUS DISEASES  T(C): 36.8 (02-02-23 @ 11:00), Max: 36.9 (02-01-23 @ 17:30)  Wt(kg): --  WBC Count: 12.89 K/uL (02-02 @ 12:22)  WBC Count: 7.95 K/uL (02-01 @ 18:00)    Recent Cultures:    Meds: diphtheria/tetanus/pertussis (acellular) Vaccine (Adacel) 0.5 milliLiter(s) IntraMuscular once  ertapenem  IVPB 1000 milliGRAM(s) IV Intermittent every 24 hours      ENDOCRINE  Capillary Blood Glucose    Meds:     ACCESS DEVICES:  [ ] Peripheral IV  [ ] Central Venous Line		[ ] R	[ ] L	[ ] IJ	[ ] Fem	[ ] SC	Placed:   [ ] Arterial Line			[ ] R	[ ] L	[ ] Fem	[ ] Rad	[ ] Ax	Placed:   [ ] PICC:					[ ] Mediport  [ ] Urinary Catheter, Date Placed:   [ ] Necessity of urinary, arterial, and venous catheters discussed    OTHER MEDICATIONS:  benzocaine 20%/menthol 0.5% Spray 1 Spray(s) Topical every 6 hours PRN  dibucaine 1% Ointment 1 Application(s) Topical every 6 hours PRN  hydrocortisone 1% Cream 1 Application(s) Topical every 6 hours PRN  lanolin Ointment 1 Application(s) Topical every 6 hours PRN  pramoxine 1%/zinc 5% Cream 1 Application(s) Topical every 4 hours PRN  witch hazel Pads 1 Application(s) Topical every 4 hours PRN      IMAGING: HISTORY:    43 y/o F w/ no significant PMH/PSH is now s/p vaginal delivery of healthy full term child after labor induction at 39 weeks and 2 days. The patient's pregnancy was complicated only by gestational hypertension. She delivered at 11AM this morning however intra op uterine atony was noted and unrelieved by Pitocin, hemabate. Sindy balloon was placed. 1U PRBC given, 1L albumin, 1L crystalloid, 4 cryo packs, TXA. EBL 1.4L    At time of visit in patient room, vital signs are stable with the exception of tachycardia to 115. Labs significant for 1 unit drop in Hgb from baseline 12.5 Platelets wnl, coags wnl, fibrinogen decreased from 500 to 150 pre op. Lactate 7.9. Mild leakage of lochia noted at bedside. Sindy with 80cc of blood, suction at 80mm Hg. Epidural in place.    SICU is consulted for hemorrhage watch i/s/o elevated lactate.     NEURO  RASS (if intubated): 		CAM ICU (if concern for delirium):  Exam: AOX3. Sleepy but responsive  Meds: acetaminophen     Tablet .. 975 milliGRAM(s) Oral <User Schedule>  diphenhydrAMINE 25 milliGRAM(s) Oral every 6 hours PRN Pruritus  ibuprofen  Tablet. 600 milliGRAM(s) Oral every 6 hours  ketorolac   Injectable 30 milliGRAM(s) IV Push once  oxyCODONE    IR 5 milliGRAM(s) Oral every 3 hours PRN Moderate to Severe Pain (4-10)  oxyCODONE    IR 5 milliGRAM(s) Oral once PRN Moderate to Severe Pain (4-10)      RESPIRATORY  RR: 20 (02-02-23 @ 11:00) (14 - 20)  SpO2: 96% (02-02-23 @ 13:54) (73% - 100%)  Wt(kg): --  Exam: On non rebreather mask at time of visit, saturating well  Mechanical Ventilation:   ABG - ( 02 Feb 2023 12:22 )  pH: x     /  pCO2: x     /  pO2: x     / HCO3: x     / Base Excess: x     /  SaO2: x       Lactate: 7.4              Meds:     CARDIOVASCULAR  HR: 115 (02-02-23 @ 13:54) (39 - 179)  BP: 132/- (02-02-23 @ 13:59) (109/61 - 206/84)  BP(mean): --  ABP: --  ABP(mean): --  Wt(kg): --  CVP(cm H2O): --    Lactate, Blood: 7.4 mmol/L (02-02 @ 12:22)    Exam:   Cardiac Rhythm:   Perfusion     [ ]Adequate   [X ]Inadequate  Mentation   [X ]Normal       [ ]Reduced  Extremities  [X ]Warm         [ ]Cool  Volume Status [ ]Hypervolemic [X ]Euvolemic [ ]Hypovolemic  Meds:     GI/NUTRITION  Exam:   Diet: NPO. Abdomen with fundus 10cm above umbilicus, firm. No abdominal pain on palpation. No surgical incisions noted  Meds: diphenoxylate/atropine 2 Tablet(s) Oral once  magnesium hydroxide Suspension 30 milliLiter(s) Oral two times a day PRN Constipation  simethicone 80 milliGRAM(s) Chew every 4 hours PRN Gas      GENITOURINARY:    Exam: Barajas with mild blood tinged urine    I&O's Detail    02-01 @ 07:01  -  02-02 @ 07:00  --------------------------------------------------------  IN:    Lactated Ringers: 1500 mL    Lactated Ringers Bolus: 1000 mL  Total IN: 2500 mL    OUT:    Indwelling Catheter - Urethral (mL): 240 mL  Total OUT: 240 mL    Total NET: 2260 mL      02-02 @ 07:01  -  02-02 @ 13:59  --------------------------------------------------------  IN:    Lactated Ringers: 125 mL  Total IN: 125 mL    OUT:  Total OUT: 0 mL    Total NET: 125 mL        Weight (kg): 72.1 (02-01 @ 17:59)  02-02    135  |  103  |  9   ----------------------------<  77  5.4<H>   |  18<L>  |  0.84    Ca    8.4      02 Feb 2023 12:22    TPro  5.5<L>  /  Alb  2.7<L>  /  TBili  0.8  /  DBili  x   /  AST  47<H>  /  ALT  15  /  AlkPhos  113  02-02    Meds: carboprost Injectable 250 MICROGram(s) IntraMuscular once  lactated ringers Bolus 1000 milliLiter(s) IV Bolus once  oxytocin Infusion 41.667 milliUNIT(s)/Min IV Continuous <Continuous>  prenatal multivitamin 1 Tablet(s) Oral daily  sodium chloride 0.9% lock flush 3 milliLiter(s) IV Push every 8 hours      HEMATOLOGIC  Meds:                         11.1   12.89 )-----------( 176      ( 02 Feb 2023 12:22 )             33.8     PT/INR - ( 02 Feb 2023 12:22 )   PT: 11.6 sec;   INR: 1.00 ratio         PTT - ( 02 Feb 2023 12:22 )  PTT:26.9 sec    INFECTIOUS DISEASES  T(C): 36.8 (02-02-23 @ 11:00), Max: 36.9 (02-01-23 @ 17:30)  Wt(kg): --  WBC Count: 12.89 K/uL (02-02 @ 12:22)  WBC Count: 7.95 K/uL (02-01 @ 18:00)    Recent Cultures:    Meds: diphtheria/tetanus/pertussis (acellular) Vaccine (Adacel) 0.5 milliLiter(s) IntraMuscular once  ertapenem  IVPB 1000 milliGRAM(s) IV Intermittent every 24 hours      ENDOCRINE  Capillary Blood Glucose    Meds:     ACCESS DEVICES:  [ ] Peripheral IV  [ ] Central Venous Line		[ ] R	[ ] L	[ ] IJ	[ ] Fem	[ ] SC	Placed:   [ ] Arterial Line			[ ] R	[ ] L	[ ] Fem	[ ] Rad	[ ] Ax	Placed:   [ ] PICC:					[ ] Mediport  [ ] Urinary Catheter, Date Placed:   [ ] Necessity of urinary, arterial, and venous catheters discussed    OTHER MEDICATIONS:  benzocaine 20%/menthol 0.5% Spray 1 Spray(s) Topical every 6 hours PRN  dibucaine 1% Ointment 1 Application(s) Topical every 6 hours PRN  hydrocortisone 1% Cream 1 Application(s) Topical every 6 hours PRN  lanolin Ointment 1 Application(s) Topical every 6 hours PRN  pramoxine 1%/zinc 5% Cream 1 Application(s) Topical every 4 hours PRN  witch hazel Pads 1 Application(s) Topical every 4 hours PRN      IMAGING:

## 2023-02-02 NOTE — OB RN DELIVERY SUMMARY - NSSELHIDDEN_OBGYN_ALL_OB_FT
[NS_DeliveryAttending1_OBGYN_ALL_OB_FT:[id number]],[NS_DeliveryAssist1_OBGYN_ALL_OB_FT:[id number]],[NS_DeliveryRN_OBGYN_ALL_OB_FT:[id number]]

## 2023-02-02 NOTE — OB PROVIDER LABOR PROGRESS NOTE - ASSESSMENT
#Labor   - Will continue w/ PO Misoprostol     #Fetal Wellbeing   - Cat 2. Previously overall reassuring. Will bolus and resuscitate  - Continue to monitor      Ulisses Sloan, PGY-1    d/w Dr. French (PGY4)
Plan: 42y y/o  @ 39w3d in stable condition  - will stop PO cytotec, and patient for expectant management  - Continuous EFM, [place]  - Con't IVF    d/w attending physician Dr. Leonora Ponce MD  PGY-1

## 2023-02-02 NOTE — OB RN DELIVERY SUMMARY - NS_SEPSISRSKCALC_OBGYN_ALL_OB_FT
EOS calculated successfully. EOS Risk Factor: 0.5/1000 live births ([facility] national incidence); GA=39w3d; Temp=98.42; ROM=4.667; GBS='Negative'; Antibiotics='No antibiotics or any antibiotics < 2 hrs prior to birth'

## 2023-02-02 NOTE — CHART NOTE - NSCHARTNOTEFT_GEN_A_CORE
Patient evaluated at bedside, reports doing well. Pain is well controlled, she reports discomfort with mercedes catheter but is otherwise without complaint, ROS unremarkable, and VSS.    GEN: well appearing, NAD  CV: RRR  RESP: breathing comfortably on 2L  AB: fundus firm  : Sindy in place (no suction), tubing removed and vaginal balloon deflated (~90cc), magda in place ~30cc of blood, pad inserted & dry  Ext: SCDs in place, nontender    Vital Signs Last 24 Hrs  T(C): 37.1 (02 Feb 2023 20:00), Max: 39.4 (02 Feb 2023 12:24)  T(F): 98.8 (02 Feb 2023 20:00), Max: 102.9 (02 Feb 2023 12:24)  HR: 82 (02 Feb 2023 23:00) (39 - 193)  BP: 114/59 (02 Feb 2023 23:00) (87/63 - 206/84)  BP(mean): 75 (02 Feb 2023 23:00) (70 - 94)  RR: 18 (02 Feb 2023 23:00) (14 - 28)  SpO2: 99% (02 Feb 2023 23:00) (73% - 100%)    Ms. Perez is a 42y PPD#0 status post vaginal delivery complicated by PPH (~2L), DIC, SINDY insertion, and resuscitation with 2u pRBC 4U albumin, 5U of cryo and now 1U FFP in SICU. SINDY suction remains off and tubing removed; SINDY to remain in place for now GYN will re-evaluate tonight. Pt hemostatic and VSS.     Appreciate excellent SICU care.    Patient status and plan of care discussed with Dr. Fung.     Janna French MD  OBGYN PGY4 Patient evaluated at bedside, reports doing well. Pain is well controlled, she reports discomfort with mercedes catheter but is otherwise without complaint, ROS unremarkable, and VSS.    GEN: well appearing, NAD  CV: RRR  RESP: breathing comfortably on 2L  AB: fundus firm  : Sindy in place (no suction), tubing removed and vaginal balloon deflated (~90cc), magda in place ~30cc of blood, pad inserted & dry  Ext: SCDs in place, nontender    Vital Signs Last 24 Hrs  T(C): 37.1 (02 Feb 2023 20:00), Max: 39.4 (02 Feb 2023 12:24)  T(F): 98.8 (02 Feb 2023 20:00), Max: 102.9 (02 Feb 2023 12:24)  HR: 82 (02 Feb 2023 23:00) (39 - 193)  BP: 114/59 (02 Feb 2023 23:00) (87/63 - 206/84)  BP(mean): 75 (02 Feb 2023 23:00) (70 - 94)  RR: 18 (02 Feb 2023 23:00) (14 - 28)  SpO2: 99% (02 Feb 2023 23:00) (73% - 100%)    Ms. Perez is a 42y PPD#0 status post vaginal delivery complicated by PPH (~2L), DIC, SINDY insertion, and resuscitation with pRBC, albumin, cryo and now FFP in SICU. SINDY suction remains off and tubing removed; SINDY to remain in place for now GYN will re-evaluate tonight. Pt hemostatic and VSS.     Appreciate excellent SICU care.    Patient status and plan of care discussed with Dr. Fung.     Janna French MD  OBGYN PGY4

## 2023-02-02 NOTE — OB PROVIDER DELIVERY SUMMARY - DELIVERY COMPLICATIONS; DISSEMINATED INTRAVASCULAR COAGULOPATHY
400 cc after delivery, low fibrinogen, repleted with 1 unit packed rbcs, 4 albumen, 1 cryoprecipitate prior to transfer to SICU

## 2023-02-02 NOTE — OB POSTPARTUM EVENT NOTE - NS_EVENTSUMMARY1_OBGYN_ALL_OB_FT
Patient w/cHTN s/p  c/b uterine atony. QBL: 1376 cc.  Uterotonics administered: Cytotec 1000mcg IA, Hemabate 0.25mg IM  Additional medications/products administered: s/p TXA 1g, 1u PRBC, 250cc albumin    Thin EMS noted on US; due to continued uterine atony despite uterotonic agents, decision made to proceed with AC placement.   AC placed with US guidance by Sheu R2; 120cc sterile saline placed in cervical seal balloon; AC attached to suction tubing to 80mmHg.   Barajas catheter placed concurrently with sandi blood return urine.   Uterine tone improved w/AC in place.   2nd IV line placed and HELLP labs/lactate drawn.  1u pRBC started.   Shortly thereafter, pt noted to be febrile to 39.4 C, tachycardic to 140s; Bedside manual /70.

## 2023-02-02 NOTE — CONSULT NOTE ADULT - ASSESSMENT
41 y/o F p/w PPH.    NEURO:    -AOX3  -Pain control with standing Tylenol, PRN Oxycodone  -Monitor for signs of AMS i/s/o hemorrhagic shock     RESPIRATORY:    -Currently on NRBR, attempt to wean to room air  -No lung pathology noted on CXR  -Non tachypneic    CARDIOVASCULAR:    -Current lactate 7.4  -Will f/u Q6 Lactate levels   -Monitor tachycardia, currently sinus tachycardia    GASTROINTESTINAL:    -Abdomen benign  -Monitor for further lochia  -Possible IR consult for [place] if continued bleeding  -NPO    RENAL:    -S/p 1L colloid, 1L crystalloid  -Barajas in, f/u UOP  -Electrolytes wnl    HEME:    -Q6 coags  -Q6 CBC  -Monitor fibrinogen for DIC s/p Cryo  -Hgb stable at this time  -Transfuse as necessary  -Hold DVT ppx    ID:    -S/p Ertapenem x 1 d i/s/o fever during delivery  -Trend WBC    ENDO:    -No needs    CODE: Full code    DISPO: SICU but boarding in LD (will tx once logistics allow) 43 y/o F p/w PPH.    NEURO:    -AOX3  -Pain control with standing Tylenol, PRN Oxycodone  -Monitor for signs of AMS i/s/o hemorrhagic shock     RESPIRATORY:    -Currently on NRBR, attempt to wean to room air  -No lung pathology noted on CXR  -Non tachypneic    CARDIOVASCULAR:    -Current lactate 7.4  -Will f/u Q6 Lactate levels   -Monitor tachycardia, currently sinus tachycardia    GASTROINTESTINAL:    -Abdomen benign  -Monitor for further lochia  -Possible IR consult for UAE if continued bleeding  -NPO    RENAL:    -S/p 1L colloid, 1L crystalloid  -Barajas in, f/u UOP  -Electrolytes wnl    HEME:    -Q6 coags  -Q6 CBC  -Monitor fibrinogen for DIC s/p Cryo  -Hgb stable at this time  -Transfuse as necessary  -Hold DVT ppx    ID:    -S/p Ertapenem x 1 d i/s/o fever during delivery  -Trend WBC    ENDO:    -No needs    CODE: Full code    Lines:     PIV x 1  Karl SHEN    DISPO: SICU but boarding in LD (will tx once logistics allow) 41 y/o F p/w PPH.    NEURO:    -AOX3  -Pain control with standing Tylenol, PRN Oxycodone  -Monitor for signs of AMS i/s/o hemorrhagic shock     RESPIRATORY:    -Currently on NRBR, attempt to wean to room air  -No lung pathology noted on CXR  -Non tachypneic    CARDIOVASCULAR:    -Current lactate 7.4  -Will f/u Q6 Lactate levels   -Monitor tachycardia, currently sinus tachycardia    GASTROINTESTINAL:    -Abdomen benign  -Monitor for further lochia  -Possible IR consult for UAE if continued bleeding  -NPO    RENAL:    -S/p 1L colloid, 1L crystalloid  -Barajas in, f/u UOP  -Electrolytes wnl    HEME:    -Q6 coags  -Q6 CBC  -Monitor fibrinogen for DIC s/p Cryo  -Hgb stable at this time  -Transfuse as necessary  -Hold DVT ppx    ID:    -S/p Ertapenem x 1 d i/s/o fever during delivery  -Trend WBC    ENDO:    -No needs    CODE: Full code    Lines:     PIV x 2  Karl SHEN    DISPO: SICU  41 y/o F p/w PPH.    NEURO:    -AOX3  -Pain control with standing Tylenol, PRN Oxycodone  -Monitor for signs of AMS i/s/o hemorrhagic shock     RESPIRATORY:    -Currently on NRBR, attempt to wean to room air  -No lung pathology noted on CXR  -Non tachypneic    CARDIOVASCULAR:    -Current lactate 7.4  -Will f/u Q6 Lactate levels   -Monitor tachycardia, currently sinus tachycardia    GASTROINTESTINAL:    -Abdomen benign  -Monitor for further lochia  -Possible IR consult for UAE if continued bleeding  -NPO  -OB requesting consult if bleeding around insertion site of AC balloon    RENAL:    -S/p 1L colloid, 1L crystalloid  -Barajas in, f/u UOP  -Electrolytes wnl    HEME:    -Q6 coags  -Q6 CBC  -Monitor fibrinogen for DIC s/p Cryo  -Hgb stable at this time  -Transfuse as necessary  -Hold DVT ppx    ID:    -S/p Ertapenem x 1 d i/s/o fever during delivery  -Trend WBC    ENDO:    -No needs    CODE: Full code    Lines:     PIV x 2  Karl SHEN    DISPO: SICU

## 2023-02-02 NOTE — OB PROVIDER DELIVERY SUMMARY - NSSELHIDDEN_OBGYN_ALL_OB_FT
[NS_DeliveryAttending1_OBGYN_ALL_OB_FT:[id number]],[NS_DeliveryAssist1_OBGYN_ALL_OB_FT:[id number]],[NS_DeliveryRN_OBGYN_ALL_OB_FT:[id number]]

## 2023-02-02 NOTE — OB PROVIDER DELIVERY SUMMARY - NSPROVIDERDELIVERYNOTE_OBGYN_ALL_OB_FT
The pt became fully dilated and after a few ctx delivered a viable female  weight 7 lbs 8 oz Apgars 7,9. During pushing, FHR tachycardia was noted. The FHR returned to baseline and  delivered with next ctx. The cord was doubly clamped and cut after a 60 second delay. The placenta delivered spontaneously intact with 3 vessel cord. Brisk bleeding from the vagina was noted with intermittent firming of the fundus. Blood clots were evacuated from the lower uterine segment as brisk bleeding continued. Extra pitocin, 1000 mg of rectal cytotec, hemabate and TXA were given with resultant intermittent firming of the fundus. The cervix and vagina were examined, no lacerations or retained products were noted. Ultrasound was performed to assess endometrial stripe which was thin. Sindy was placed with some oozing of blood around the tubing with another 400 cc collection of blood. Minimal bleeding was coming through the Sindy tubing when suction applied. Second IV started, labs sent, mercedes catheter placed with bloody urine noted. The pt received 1 unit of packed rbcs, 2 units of cryoprecipitate and 4 units of albumen prior to transfer to the SICU. Labs returned consistent with DIC. She remained conscious and responsive and cooperative throughout the delivery and postpartum course thus far. Her only complaint was that she was tired.  Her temp was 39.4 within the first hour post partum and Invanz and tylenol were given. Warm blankets placed for chills, O2 applied for mild tachypnea. BP was arduous to document accurately secondary to pt shaking. The pt was transferred to the SICU with Sindy in place and minimally bleeding. [place] to NICU for maternal fever. Liudmila Lea MD

## 2023-02-02 NOTE — OB POSTPARTUM EVENT NOTE - NS_EVENTFINDINGS1_OBGYN_ALL_OB_FT
- f/u labs  - monitor VS  - administer Invanz, Tylenol, Benadryl  - administer [id number] albumin per anesthesia  - additional 2u PRBC and 2FFP ordered on hold

## 2023-02-02 NOTE — OB PROVIDER LABOR PROGRESS NOTE - NS_SUBJECTIVE/OBJECTIVE_OBGYN_ALL_OB_FT
PGY1 Labor & Delivery Progress Note     Pt examined @ 0354 due to cat2 tracing     T(C): 36.6 (02-02-23 @ 01:00), Max: 36.9 (02-01-23 @ 17:30)  HR: 84 (02-02-23 @ 05:14) (39 - 101)  BP: 110/59 (02-02-23 @ 05:14) (109/61 - 158/73)  RR: 14 (02-02-23 @ 01:00) (14 - 20)  SpO2: 95% (02-02-23 @ 05:09) (93% - 100%)
R1 Labor & Delivery Progress Note     Pt seen & examined at bedside for cervical exam. patient with SROM @ 7a.    T(C): 36.8 (02-02-23 @ 05:00), Max: 36.9 (02-01-23 @ 17:30)  HR: 103 (02-02-23 @ 07:29) (39 - 126)  BP: 133/77 (02-02-23 @ 07:29) (109/61 - 158/73)  RR: 16 (02-02-23 @ 05:00) (14 - 20)  SpO2: 99% (02-02-23 @ 07:29) (93% - 100%)

## 2023-02-02 NOTE — OB POSTPARTUM EVENT NOTE - NS_EVENTSUMMARY2_OBGYN_ALL_OB_FT
- labs reviewed: H/H 11.1/33.8, Plt 176, Fibrinogen 132, K 5.4 (hemolyzed)  - HR improving to 120s, BPs 120s/70s - pt tachypneic with O2 sat 95% -> 100% on nonrebreather   - lungs CTAB  - labs reviewed: H/H 11.1/33.8, Plt 176, Fibrinogen 132, K 5.4 (hemolyzed)  - HR improving to 120s, BPs 120s/70s

## 2023-02-02 NOTE — CHART NOTE - NSCHARTNOTEFT_GEN_A_CORE
R2 AC Placement Note    Patient s/p  in pregnancy c/b chronic hypertension with total EBL 1376 s/p TXA 1g, cytotec TN, hemabate due to intermittent atony. Due to continued atony AC placed under ultrasound guidance, thin endometrial stripe noted and position of AC confirmed on ultrasound. 120cc sterile saline placed in cervical seal balloon with return of 100cc blood in suction tubing. Barajas catheter placed concurrently with sandi blood in urine. Uterine tone noted to be improved after AC inserted. 1u pRBC given at bedside for acute blood loss. Noted to be febrile to 39.4 C.    Vital Signs Last 24 Hrs  T(C): 36.8 (2023 11:00), Max: 36.9 (2023 17:30)  T(F): 98.2 (2023 11:00), Max: 98.42 (2023 17:30)  HR: 142 (2023 12:29) (39 - 179)  BP: 206/84 (2023 12:14) (109/61 - 206/84)  RR: 20 (2023 11:00) (14 - 20)  SpO2: 90% (2023 12:29) (73% - 100%)    - Stat CBC/coags/fibrinogen/lactate sent  - Invanz started  - Continue to monitor    Attending Dr Lea and  Dr Joyce at bedside.    Talia Herrera  PGY-2 R2 AC Placement Note    Patient s/p  in pregnancy c/b chronic hypertension with total EBL 1376 s/p TXA 1g, cytotec OR, hemabate due to intermittent atony. Due to continued atony AC placed under ultrasound guidance, thin endometrial stripe noted and position of AC confirmed on ultrasound. 120cc sterile saline placed in cervical seal balloon with return of 100cc blood in suction tubing. Barajas catheter placed concurrently with sandi blood in urine. Uterine tone noted to be improved after AC inserted. 1u pRBC given at bedside for acute blood loss. Noted to be febrile to 39.4 C. Bedside manual /70.    Vital Signs Last 24 Hrs  T(C): 36.8 (2023 11:00), Max: 36.9 (2023 17:30)  T(F): 98.2 (2023 11:00), Max: 98.42 (2023 17:30)  HR: 142 (2023 12:29) (39 - 179)  BP: 206/84 (2023 12:14) (109/61 - 206/84)  RR: 20 (2023 11:00) (14 - 20)  SpO2: 90% (2023 12:29) (73% - 100%)    - Stat CBC/coags/fibrinogen/lactate sent  - Invanz started  - Continue to monitor    Attending Dr Lea and  Dr Joyce at bedside.    Talia Herrera  PGY-2 R2 AC Placement Note    Patient s/p  in pregnancy c/b chronic hypertension with total EBL 1376 s/p TXA 1g, cytotec [place], hemabate due to intermittent atony. Due to continued atony AC placed under ultrasound guidance, thin endometrial stripe noted and position of AC confirmed on ultrasound. 120cc sterile saline placed in cervical seal balloon with return of 100cc blood in suction tubing. Barajas catheter placed concurrently with sandi blood in urine. Uterine tone noted to be improved after AC inserted. 1u pRBC given at bedside for acute blood loss. Noted to be febrile to 39.4 C. Bedside manual /70.    Vital Signs Last 24 Hrs  T(C): 36.8 (2023 11:00), Max: 36.9 (2023 17:30)  T(F): 98.2 (2023 11:00), Max: 98.42 (2023 17:30)  HR: 142 (2023 12:29) (39 - 179)  BP: 206/84 (2023 12:14) (109/61 - 206/84)  RR: 20 (2023 11:00) (14 - 20)  SpO2: 90% (2023 12:29) (73% - 100%)    - Stat CBC/coags/fibrinogen/lactate sent  - Invanz started  - Continue to monitor    Attending Dr Lea and  Dr Joyce at bedside.    Talia Herrera  PGY-2    Addendum  @ 215p:  - AC in place for 2 hours on suction, turned off suction and cervical seal balloon deflated  - 100cc in tubing evacuated    Talia Herrera  PGY-2

## 2023-02-02 NOTE — OB POSTPARTUM EVENT NOTE - NS_EVENTFINDINGS2_OBGYN_ALL_OB_FT
- will repeat CBC, BMP, coags  - will administer 2 Cryo - will repeat CBC, BMP, coags  - will administer 2 Cryo  - SICU consult placed 2/2 DIC and sepsis

## 2023-02-03 LAB
ALBUMIN SERPL ELPH-MCNC: 2.8 G/DL — LOW (ref 3.3–5)
ALP SERPL-CCNC: 59 U/L — SIGNIFICANT CHANGE UP (ref 40–120)
ALT FLD-CCNC: 15 U/L — SIGNIFICANT CHANGE UP (ref 4–33)
ANION GAP SERPL CALC-SCNC: 11 MMOL/L — SIGNIFICANT CHANGE UP (ref 7–14)
APTT BLD: 22.4 SEC — LOW (ref 27–36.3)
AST SERPL-CCNC: 50 U/L — HIGH (ref 4–32)
BILIRUB SERPL-MCNC: 1 MG/DL — SIGNIFICANT CHANGE UP (ref 0.2–1.2)
BUN SERPL-MCNC: 11 MG/DL — SIGNIFICANT CHANGE UP (ref 7–23)
CALCIUM SERPL-MCNC: 8.1 MG/DL — LOW (ref 8.4–10.5)
CHLORIDE SERPL-SCNC: 106 MMOL/L — SIGNIFICANT CHANGE UP (ref 98–107)
CO2 SERPL-SCNC: 21 MMOL/L — LOW (ref 22–31)
CREAT SERPL-MCNC: 0.71 MG/DL — SIGNIFICANT CHANGE UP (ref 0.5–1.3)
EGFR: 109 ML/MIN/1.73M2 — SIGNIFICANT CHANGE UP
FIBRINOGEN PPP-MCNC: 240 MG/DL — SIGNIFICANT CHANGE UP (ref 200–465)
FIBRINOGEN PPP-MCNC: 246 MG/DL — SIGNIFICANT CHANGE UP (ref 200–465)
GLUCOSE SERPL-MCNC: 80 MG/DL — SIGNIFICANT CHANGE UP (ref 70–99)
HCT VFR BLD CALC: 21.8 % — LOW (ref 34.5–45)
HCT VFR BLD CALC: 23.2 % — LOW (ref 34.5–45)
HGB BLD-MCNC: 7.4 G/DL — LOW (ref 11.5–15.5)
HGB BLD-MCNC: 7.7 G/DL — LOW (ref 11.5–15.5)
INR BLD: 1.03 RATIO — SIGNIFICANT CHANGE UP (ref 0.88–1.16)
LACTATE SERPL-SCNC: 1.4 MMOL/L — SIGNIFICANT CHANGE UP (ref 0.5–2)
LACTATE SERPL-SCNC: 1.6 MMOL/L — SIGNIFICANT CHANGE UP (ref 0.5–2)
MAGNESIUM SERPL-MCNC: 1.8 MG/DL — SIGNIFICANT CHANGE UP (ref 1.6–2.6)
MCHC RBC-ENTMCNC: 32 PG — SIGNIFICANT CHANGE UP (ref 27–34)
MCHC RBC-ENTMCNC: 32 PG — SIGNIFICANT CHANGE UP (ref 27–34)
MCHC RBC-ENTMCNC: 33.2 GM/DL — SIGNIFICANT CHANGE UP (ref 32–36)
MCHC RBC-ENTMCNC: 33.9 GM/DL — SIGNIFICANT CHANGE UP (ref 32–36)
MCV RBC AUTO: 94.4 FL — SIGNIFICANT CHANGE UP (ref 80–100)
MCV RBC AUTO: 96.3 FL — SIGNIFICANT CHANGE UP (ref 80–100)
NRBC # BLD: 0 /100 WBCS — SIGNIFICANT CHANGE UP (ref 0–0)
NRBC # BLD: 0 /100 WBCS — SIGNIFICANT CHANGE UP (ref 0–0)
NRBC # FLD: 0 K/UL — SIGNIFICANT CHANGE UP (ref 0–0)
NRBC # FLD: 0 K/UL — SIGNIFICANT CHANGE UP (ref 0–0)
PLATELET # BLD AUTO: 100 K/UL — LOW (ref 150–400)
PLATELET # BLD AUTO: 89 K/UL — LOW (ref 150–400)
POTASSIUM SERPL-MCNC: 3.9 MMOL/L — SIGNIFICANT CHANGE UP (ref 3.5–5.3)
POTASSIUM SERPL-SCNC: 3.9 MMOL/L — SIGNIFICANT CHANGE UP (ref 3.5–5.3)
PROT SERPL-MCNC: 4.9 G/DL — LOW (ref 6–8.3)
PROTHROM AB SERPL-ACNC: 11.9 SEC — SIGNIFICANT CHANGE UP (ref 10.5–13.4)
RBC # BLD: 2.31 M/UL — LOW (ref 3.8–5.2)
RBC # BLD: 2.41 M/UL — LOW (ref 3.8–5.2)
RBC # FLD: 14.7 % — HIGH (ref 10.3–14.5)
RBC # FLD: 14.9 % — HIGH (ref 10.3–14.5)
SODIUM SERPL-SCNC: 138 MMOL/L — SIGNIFICANT CHANGE UP (ref 135–145)
WBC # BLD: 14.47 K/UL — HIGH (ref 3.8–10.5)
WBC # BLD: 18.09 K/UL — HIGH (ref 3.8–10.5)
WBC # FLD AUTO: 14.47 K/UL — HIGH (ref 3.8–10.5)
WBC # FLD AUTO: 18.09 K/UL — HIGH (ref 3.8–10.5)

## 2023-02-03 PROCEDURE — 99233 SBSQ HOSP IP/OBS HIGH 50: CPT

## 2023-02-03 RX ORDER — IBUPROFEN 200 MG
600 TABLET ORAL EVERY 6 HOURS
Refills: 0 | Status: COMPLETED | OUTPATIENT
Start: 2023-02-03 | End: 2024-01-02

## 2023-02-03 RX ORDER — SODIUM CHLORIDE 9 MG/ML
1000 INJECTION, SOLUTION INTRAVENOUS ONCE
Refills: 0 | Status: COMPLETED | OUTPATIENT
Start: 2023-02-03 | End: 2023-02-03

## 2023-02-03 RX ORDER — HEPARIN SODIUM 5000 [USP'U]/ML
5000 INJECTION INTRAVENOUS; SUBCUTANEOUS EVERY 8 HOURS
Refills: 0 | Status: DISCONTINUED | OUTPATIENT
Start: 2023-02-03 | End: 2023-02-03

## 2023-02-03 RX ORDER — HEPARIN SODIUM 5000 [USP'U]/ML
5000 INJECTION INTRAVENOUS; SUBCUTANEOUS EVERY 12 HOURS
Refills: 0 | Status: DISCONTINUED | OUTPATIENT
Start: 2023-02-03 | End: 2023-02-07

## 2023-02-03 RX ORDER — ERTAPENEM SODIUM 1 G/1
1000 INJECTION, POWDER, LYOPHILIZED, FOR SOLUTION INTRAMUSCULAR; INTRAVENOUS ONCE
Refills: 0 | Status: COMPLETED | OUTPATIENT
Start: 2023-02-04 | End: 2023-02-04

## 2023-02-03 RX ORDER — SODIUM CHLORIDE 9 MG/ML
1000 INJECTION, SOLUTION INTRAVENOUS ONCE
Refills: 0 | Status: DISCONTINUED | OUTPATIENT
Start: 2023-02-03 | End: 2023-02-03

## 2023-02-03 RX ADMIN — Medication 975 MILLIGRAM(S): at 04:30

## 2023-02-03 RX ADMIN — Medication 975 MILLIGRAM(S): at 21:20

## 2023-02-03 RX ADMIN — Medication 1 TABLET(S): at 11:34

## 2023-02-03 RX ADMIN — Medication 975 MILLIGRAM(S): at 15:30

## 2023-02-03 RX ADMIN — Medication 975 MILLIGRAM(S): at 09:18

## 2023-02-03 RX ADMIN — SODIUM CHLORIDE 75 MILLILITER(S): 9 INJECTION, SOLUTION INTRAVENOUS at 09:19

## 2023-02-03 RX ADMIN — Medication 975 MILLIGRAM(S): at 15:17

## 2023-02-03 RX ADMIN — Medication 975 MILLIGRAM(S): at 10:00

## 2023-02-03 RX ADMIN — SODIUM CHLORIDE 3 MILLILITER(S): 9 INJECTION INTRAMUSCULAR; INTRAVENOUS; SUBCUTANEOUS at 21:53

## 2023-02-03 RX ADMIN — Medication 975 MILLIGRAM(S): at 20:47

## 2023-02-03 RX ADMIN — HEPARIN SODIUM 5000 UNIT(S): 5000 INJECTION INTRAVENOUS; SUBCUTANEOUS at 18:12

## 2023-02-03 RX ADMIN — Medication 975 MILLIGRAM(S): at 03:52

## 2023-02-03 RX ADMIN — ERTAPENEM SODIUM 120 MILLIGRAM(S): 1 INJECTION, POWDER, LYOPHILIZED, FOR SOLUTION INTRAMUSCULAR; INTRAVENOUS at 12:23

## 2023-02-03 RX ADMIN — SODIUM CHLORIDE 3 MILLILITER(S): 9 INJECTION INTRAMUSCULAR; INTRAVENOUS; SUBCUTANEOUS at 06:06

## 2023-02-03 RX ADMIN — SODIUM CHLORIDE 3 MILLILITER(S): 9 INJECTION INTRAMUSCULAR; INTRAVENOUS; SUBCUTANEOUS at 13:31

## 2023-02-03 RX ADMIN — SODIUM CHLORIDE 1000 MILLILITER(S): 9 INJECTION, SOLUTION INTRAVENOUS at 13:31

## 2023-02-03 NOTE — CHART NOTE - NSCHARTNOTEFT_GEN_A_CORE
Pt received bed 527T, signed out OB resident Izaiah Serrano. Pt remains hemodynamically stable, ready to be downgraded to regular floor.

## 2023-02-03 NOTE — PROGRESS NOTE ADULT - SUBJECTIVE AND OBJECTIVE BOX
OB Progress Note:  PPD#1    S: 43yo PPD#1 s/p  c/b PPH( EBL:2L) with Sindy placement and use of uterotonics. Patient then with labs concerning for DIC.Patient feels well. Pain is well controlled. She is currently NPO diet, not passing flatus. Barajas in place. Has not ambulated as of yet. Sindy in place , no bleeding around Sindy since deflation of the balloon. soaking less than 1 pad/hour. Denies CP/SOB. Denies lightheadedness/dizziness. Denies N/V.     O:  Vitals:  Vital Signs Last 24 Hrs  T(C): 36.2 (2023 00:00), Max: 39.4 (2023 12:24)  T(F): 97.2 (2023 00:00), Max: 102.9 (2023 12:24)  HR: 100 (2023 00:00) (62 - 193)  BP: 115/85 (2023 00:00) (87/63 - 206/84)  BP(mean): 95 (2023 00:00) (70 - 95)  RR: 20 (2023 00:00) (16 - 28)  SpO2: 100% (2023 00:00) (73% - 100%)    Parameters below as of 2023 00:00  Patient On (Oxygen Delivery Method): nasal cannula  O2 Flow (L/min): 2      MEDICATIONS  (STANDING):  acetaminophen     Tablet .. 975 milliGRAM(s) Oral <User Schedule>  diphenoxylate/atropine 2 Tablet(s) Oral once  diphtheria/tetanus/pertussis (acellular) Vaccine (Adacel) 0.5 milliLiter(s) IntraMuscular once  ertapenem  IVPB 1000 milliGRAM(s) IV Intermittent every 24 hours  lactated ringers. 1000 milliLiter(s) (75 mL/Hr) IV Continuous <Continuous>  prenatal multivitamin 1 Tablet(s) Oral daily  sodium chloride 0.9% lock flush 3 milliLiter(s) IV Push every 8 hours      Labs:  Blood type: O Positive  Rubella IgG: RPR: Negative                          7.8<L>   17.10<H> >-----------< 113<L>    (  18:00 )             23.2<L>                        9.2<L>   15.74<H> >-----------< 112<L>    (  @ 13:30 )             27.3<L>                        11.1<L>   12.89<H> >-----------< 176    ( :22 )             33.8<L>                        12.4   7.95 >-----------< 217    (  @ 18:00 )             36.5    23 @ 18:00      138  |  106  |  10  ----------------------------<  76  3.5   |  20<L>  |  0.79    23 13:30      136  |  105  |  10  ----------------------------<  81  3.7   |  18<L>  |  0.78    23 @ 12:22      135  |  103  |  9   ----------------------------<  77  5.4<H>   |  18<L>  |  0.84    23 18:00      136  |  104  |  9   ----------------------------<  81  4.0   |  19<L>  |  0.54        Ca    8.2<L>      2023 18:00  Ca    7.5<L>      2023 13:30  Ca    8.4      2023 12:22  Ca    9.4      2023 18:00  Phos  3.2     -  Mg     1.30<L>         TPro  5.5<L>  /  Alb  2.7<L>  /  TBili  0.8  /  DBili  x   /  AST  47<H>  /  ALT  15  /  AlkPhos  113  23 @ 12:22  TPro  6.8  /  Alb  3.4  /  TBili  0.3  /  DBili  x   /  AST  22  /  ALT  14  /  AlkPhos  120  23 @ 18:00          Physical Exam:  General: NAD  Heart: all extremities well perfused  Lungs: breathing comfortably  Abdomen: soft, non-tender, non-distended, fundus firm  Vaginal: Sindy in place, magda underneath with 30cc of blood ( has been in place for the past 1.5 hrs)  Extremities: No calf tenderness or erythema

## 2023-02-03 NOTE — PROGRESS NOTE ADULT - SUBJECTIVE AND OBJECTIVE BOX
MFM Consult - Daily Progress Note    [id number]  DOMINICK ALONSO    Subjective: Patient is feeling well and is without complaints. She denies dizziness, chest pain or shortness of breath. She has not ambulated yet.    Objective:  T(C): 36.6 (02-03-23 @ 15:30), Max: 38 (02-02-23 @ 16:55)  HR: 79 (02-03-23 @ 15:30) (73 - 112)  BP: 120/75 (02-03-23 @ 15:30) (87/63 - 138/77)  RR: 24 (02-03-23 @ 15:30) (16 - 28)  SpO2: 96% (02-03-23 @ 15:30) (93% - 100%)    02-02-23 @ 07:01  -  02-03-23 @ 07:00  --------------------------------------------------------  IN: 1225 mL / OUT: 835 mL / NET: 390 mL    02-03-23 @ 07:01  -  02-03-23 @ 15:58  --------------------------------------------------------  IN: 2110 mL / OUT: 450 mL / NET: 1660 mL    Physical Exam:  General: Well appearing, resting comfortably in bed  Cardiac: Regular rate  Pulm: Breathing comfortably on room air  Abdomen: Soft, non-tender, no rebound or guarding, fundus firm 2-3 fingerbreadths above the umbilicus (patient seated though)  Gyn: Scant blood on pad and no bleeding with fundal massage. Concentrated urine in mercedes catheter.  Extremities: No erythema, no tenderness to calf palpation, warm and well perfused    Labs:                        7.4    14.47 )-----------( 100      ( 03 Feb 2023 06:56 )             21.8   02-03    138  |  106  |  11  ----------------------------<  80  3.9   |  21<L>  |  0.71    Ca    8.1<L>      03 Feb 2023 00:58  Phos  3.2     02-02  Mg     1.80     02-03    TPro  4.9<L>  /  Alb  2.8<L>  /  TBili  1.0  /  DBili  x   /  AST  50<H>  /  ALT  15  /  AlkPhos  59  02-03    PT/INR - ( 03 Feb 2023 06:56 )   PT: 11.9 sec;   INR: 1.03 ratio         PTT - ( 03 Feb 2023 06:56 )  PTT:22.4 sec    Fibrinogen: 246    Lactate 1.4

## 2023-02-03 NOTE — DIETITIAN INITIAL EVALUATION ADULT - PERTINENT MEDS FT
MEDICATIONS  (STANDING):  acetaminophen     Tablet .. 975 milliGRAM(s) Oral <User Schedule>  diphenoxylate/atropine 2 Tablet(s) Oral once  diphtheria/tetanus/pertussis (acellular) Vaccine (Adacel) 0.5 milliLiter(s) IntraMuscular once  ertapenem  IVPB 1000 milliGRAM(s) IV Intermittent every 24 hours  heparin   Injectable 5000 Unit(s) SubCutaneous every 8 hours  prenatal multivitamin 1 Tablet(s) Oral daily  sodium chloride 0.9% lock flush 3 milliLiter(s) IV Push every 8 hours    MEDICATIONS  (PRN):  benzocaine 20%/menthol 0.5% Spray 1 Spray(s) Topical every 6 hours PRN for Perineal discomfort  dibucaine 1% Ointment 1 Application(s) Topical every 6 hours PRN Perineal discomfort  diphenhydrAMINE 25 milliGRAM(s) Oral every 6 hours PRN Pruritus  hydrocortisone 1% Cream 1 Application(s) Topical every 6 hours PRN Moderate Pain (4-6)  lanolin Ointment 1 Application(s) Topical every 6 hours PRN nipple soreness  magnesium hydroxide Suspension 30 milliLiter(s) Oral two times a day PRN Constipation  oxyCODONE    IR 5 milliGRAM(s) Oral every 3 hours PRN Moderate to Severe Pain (4-10)  oxyCODONE    IR 5 milliGRAM(s) Oral once PRN Moderate to Severe Pain (4-10)  pramoxine 1%/zinc 5% Cream 1 Application(s) Topical every 4 hours PRN Moderate Pain (4-6)  simethicone 80 milliGRAM(s) Chew every 4 hours PRN Gas  witch hazel Pads 1 Application(s) Topical every 4 hours PRN Perineal discomfort

## 2023-02-03 NOTE — PROGRESS NOTE ADULT - ASSESSMENT
Assessment: 43 YO  with h/o CHTN PPD1 from  complicated by postpartum hemorrhage 2/2 uterine atony (EBL 2 L) resulting in DIC. Patient is s/p cytotec, hemabate, TXA, Sindy, and was transfused 1U pRBCs, 1U cryoprecipitate and 1U FFP. Hemoglobin has now stabilized and platelets and fibrinogen are rising appropriately after transfusion. Patient is non-tachycardic and normotensive. UOP borderline adequate at 30-40 cc/hr (adequate would be 35 cc/hr) likely in the setting of intravascular volume depletion. No evidence of ongoing blood loss.     Recommendations:  -1 L crystalloid  -Continue mercedes catheter for strict I/O monitoring until urine output increases  -Consider further blood transfusion if patient symptomatic or becomes tachycardic once ambulating  -Patient safe for transfer to postpartum    Nette Biswas MD  [facility] Fellow  Attg: Dr. Casey

## 2023-02-03 NOTE — PROGRESS NOTE ADULT - SUBJECTIVE AND OBJECTIVE BOX
Overnight events  - Stopped NSAIDS  - f/u fibrinogen, transfuse cryoprecipitate as needed (if it drops further)  - CLD if CBC stable  - f/u CMP incl. Magnesium  - stopped oxytocin  - On room air        HISTORY:    41 y/o F w/ no significant PMH/PSH is now s/p vaginal delivery of healthy full term child after labor induction at 39 weeks and 2 days. The patient's pregnancy was complicated only by gestational hypertension. She delivered at 11AM this morning however intra op uterine atony was noted and unrelieved by Pitocin hemabate. Sindy balloon was placed. 1U PRBC given, 1L albumin, 1L crystalloid, 4 cryo packs, TXA. EBL 1.4L    At time of visit in patient room, vital signs are stable with the exception of tachycardia to 115. Labs significant for 1 unit drop in Hgb from baseline 12.5 Platelets wnl, coags wnl, fibrinogen decreased from 500 to 150 pre op. Lactate 7.9. Mild leakage of lochia noted at bedside. Sindy with 80cc of blood, suction at 80mm Hg. Epidural in place.    SICU is consulted for hemorrhage watch i/s/o elevated lactate.     NEURO  RASS (if intubated): 		[facility] ICU (if concern for delirium):  Exam: AOX3. Sleepy but responsive  Meds: acetaminophen     Tablet .. 975 milliGRAM(s) Oral <User Schedule>  diphenhydrAMINE 25 milliGRAM(s) Oral every 6 hours PRN Pruritus  ibuprofen  Tablet. 600 milliGRAM(s) Oral every 6 hours  ketorolac   Injectable 30 milliGRAM(s) IV Push once  oxyCODONE    IR 5 milliGRAM(s) Oral every 3 hours PRN Moderate to Severe Pain (4-10)  oxyCODONE    IR 5 milliGRAM(s) Oral once PRN Moderate to Severe Pain (4-10)      RESPIRATORY  RR: 20 (02-02-23 @ 11:00) (14 - 20)  SpO2: 96% (02-02-23 @ 13:54) (73% - 100%)  Wt(kg): --  Exam: On non rebreather mask at time of visit, saturating well  Mechanical Ventilation:   ABG - ( 02 Feb 2023 12:22 )  pH: x     /  pCO2: x     /  pO2: x     / HCO3: x     / Base Excess: x     /  SaO2: x       Lactate: 7.4              Meds:     CARDIOVASCULAR  HR: 115 (02-02-23 @ 13:54) (39 - 179)  BP: 132/- (02-02-23 @ 13:59) (109/61 - 206/84)  BP(mean): --  ABP: --  ABP(mean): --  Wt(kg): --  CVP(cm H2O): --    Lactate, Blood: 7.4 mmol/L (02-02 @ 12:22)    Exam:   Cardiac Rhythm:   Perfusion     [ ]Adequate   [X ]Inadequate  Mentation   [X ]Normal       [ ]Reduced  Extremities  [X ]Warm         [ ]Cool  Volume Status [ ]Hypervolemic [X ]Euvolemic [ ]Hypovolemic  Meds:     GI/NUTRITION  Exam:   Diet: NPO. Abdomen with fundus 10cm above umbilicus, firm. No abdominal pain on palpation. No surgical incisions noted  Meds: diphenoxylate/atropine 2 Tablet(s) Oral once  magnesium hydroxide Suspension 30 milliLiter(s) Oral two times a day PRN Constipation  simethicone 80 milliGRAM(s) Chew every 4 hours PRN Gas      GENITOURINARY:    Exam: Barajas with mild blood tinged urine    I&O's Detail    02-01 @ 07:01  -  02-02 @ 07:00  --------------------------------------------------------  IN:    Lactated Ringers: 1500 mL    Lactated Ringers Bolus: 1000 mL  Total IN: 2500 mL    OUT:    Indwelling Catheter - Urethral (mL): 240 mL  Total OUT: 240 mL    Total NET: 2260 mL      02-02 @ 07:01  -  02-02 @ 13:59  --------------------------------------------------------  IN:    Lactated Ringers: 125 mL  Total IN: 125 mL    OUT:  Total OUT: 0 mL    Total NET: 125 mL        Weight (kg): 72.1 (02-01 @ 17:59)  02-02    135  |  103  |  9   ----------------------------<  77  5.4<H>   |  18<L>  |  0.84    Ca    8.4      02 Feb 2023 12:22    TPro  5.5<L>  /  Alb  2.7<L>  /  TBili  0.8  /  DBili  x   /  AST  47<H>  /  ALT  15  /  AlkPhos  113  02-02    Meds: carboprost Injectable 250 MICROGram(s) IntraMuscular once  lactated ringers Bolus 1000 milliLiter(s) IV Bolus once  oxytocin Infusion 41.667 milliUNIT(s)/Min IV Continuous <Continuous>  prenatal multivitamin 1 Tablet(s) Oral daily  sodium chloride 0.9% lock flush 3 milliLiter(s) IV Push every 8 hours      HEMATOLOGIC  Meds:                         11.1   12.89 )-----------( 176      ( 02 Feb 2023 12:22 )             33.8     PT/INR - ( 02 Feb 2023 12:22 )   PT: 11.6 sec;   INR: 1.00 ratio         PTT - ( 02 Feb 2023 12:22 )  PTT:26.9 sec    INFECTIOUS DISEASES  T(C): 36.8 (02-02-23 @ 11:00), Max: 36.9 (02-01-23 @ 17:30)  Wt(kg): --  WBC Count: 12.89 K/uL (02-02 @ 12:22)  WBC Count: 7.95 K/uL (02-01 @ 18:00)    Recent Cultures:    Meds: diphtheria/tetanus/pertussis (acellular) Vaccine (Adacel) 0.5 milliLiter(s) IntraMuscular once  ertapenem  IVPB 1000 milliGRAM(s) IV Intermittent every 24 hours      ENDOCRINE  Capillary Blood Glucose    Meds:     ACCESS DEVICES:  [ ] Peripheral IV  [ ] Central Venous Line		[ ] R	[ ] L	[ ] IJ	[ ] Fem	[ ] SC	Placed:   [ ] Arterial Line			[ ] R	[ ] L	[ ] Fem	[ ] Rad	[ ] Ax	Placed:   [ ] PICC:					[ ] Mediport  [ ] Urinary Catheter, Date Placed:   [ ] Necessity of urinary, arterial, and venous catheters discussed    OTHER MEDICATIONS:  benzocaine 20%/menthol 0.5% Spray 1 Spray(s) Topical every 6 hours PRN  dibucaine 1% Ointment 1 Application(s) Topical every 6 hours PRN  hydrocortisone 1% Cream 1 Application(s) Topical every 6 hours PRN  lanolin Ointment 1 Application(s) Topical every 6 hours PRN  pramoxine 1%/zinc 5% Cream 1 Application(s) Topical every 4 hours PRN  witch hazel Pads 1 Application(s) Topical every 4 hours PRN      IMAGING:

## 2023-02-03 NOTE — DIETITIAN INITIAL EVALUATION ADULT - PERTINENT LABORATORY DATA
02-03    138  |  106  |  11  ----------------------------<  80  3.9   |  21<L>  |  0.71    Ca    8.1<L>      03 Feb 2023 00:58  Phos  3.2     02-02  Mg     1.80     02-03    TPro  4.9<L>  /  Alb  2.8<L>  /  TBili  1.0  /  DBili  x   /  AST  50<H>  /  ALT  15  /  AlkPhos  59  02-03

## 2023-02-03 NOTE — DIETITIAN INITIAL EVALUATION ADULT - OTHER INFO
41 y/o F w/ no significant PMH/PSH is now s/p vaginal delivery of healthy full term child after labor induction at 39 weeks and 2 days. The patient's pregnancy was complicated only by gestational hypertension. She delivered at 11AM this morning however intra op uterine atony was noted and unrelieved by Pitocin, hemabate. Sindy balloon was placed. 1U PRBC given, 1L albumin, 1L crystalloid, 4 cryo packs, TXA. EBL 1.4L.    SICU is consulted for hemorrhage watch i/s/o elevated lactate.     Pt was receiving treatment with Respiratory at time of visit. Limited information received.  Pt is requesting to receive Ensure Clear, Jello and Italian Ices. Diet advanced now from Clears to Regular. Pt endorses nausea with eating.   Spoke with Pt's Mom at bedside. Noted admit weight 159# (? pre delivery wt) and height 64 inches. Pt's Mom reports height is 60 inches with pre pregnancy weight of 170#.  41 y/o F w/ no significant PMH/PSH is now s/p vaginal delivery of healthy full term child after labor induction at 39 weeks and 2 days. The patient's pregnancy was complicated only by gestational hypertension. She delivered at 11AM this morning however intra op uterine atony was noted and unrelieved by Pitocin, hemabate. Sindy balloon was placed. 1U PRBC given, 1L albumin, 1L crystalloid, 4 cryo packs, TXA. EBL 1.4L.    SICU is consulted for hemorrhage watch i/s/o elevated lactate.     Pt was receiving treatment with Respiratory at time of visit. Limited information received.  Pt is requesting to receive Ensure Clear, Jello and Italian Ices. Diet advanced now from Clears to Regular. Pt endorses nausea with eating.   Spoke with Pt's Mom at bedside. Noted admit weight 159# (? pre delivery wt) and height 64 inches. Pt's Mom reports height is 60 inches with pre pregnancy weight of 170#.  Currently remains with 1+ and 2+ edema.

## 2023-02-03 NOTE — PROGRESS NOTE ADULT - ASSESSMENT
43 y/o F s/p vaginal delivery complicated by uterine atony, transferred in SICU for hemorrhage watch.    Overnight events        NEURO:    -AOX3  -Pain control with standing Tylenol, PRN Oxycodone  -Monitor for signs of AMS i/s/o hemorrhagic shock     RESPIRATORY:    - on RA  -No lung pathology noted on CXR  -Non tachypneic    CARDIOVASCULAR:    -Current lactate 7.4  -Will f/u Q6 Lactate levels   -Monitor tachycardia, currently sinus tachycardia    GASTROINTESTINAL:    -Abdomen benign  -Monitor for further lochia  -Possible IR consult for [place] if continued bleeding  -NPO  - CLD if CBC stable  - f/u CMP incl. Magnesium      RENAL:    -S/p 1L colloid, 1L crystalloid  -Barajas in, f/u UOP  -Electrolytes wnl  -OB to be consulted if bleeding around insertion site of AC balloon    HEME:    -Q6 coags  -Q6 CBC  -Monitor fibrinogen for DIC s/p Cryo  -Hgb dropped to 7.8/23.2 from 9.2/27.3  -Transfuse as necessary  -Hold DVT ppx  - Stopped NSAIDS  - f/u fibrinogen, transfuse cryoprecipitate as needed (if it drops further)    ID:    -S/p Ertapenem x 1 d i/s/o fever during delivery  -Trend WBC    ENDO:    - stopped oxytocin  -No needs    CODE: Full code    Lines:     PIV x 2  Karl SHEN    DISPO: SICU

## 2023-02-03 NOTE — PROGRESS NOTE ADULT - ASSESSMENT
43y/o   PPD#1 from  c/b PPH requiring Sindy placement and uterotonics for EBL 2l leading to DIC . Intrapartum course complicated by chorio Patient stable , currently in SICU for hemodynamic monitoring     Neuro: Pain well controlled. PO pain meds, Tylenol and Oxycodone for pain control  CV: Labs from 6PM showing H/H 7.8/23.2, Recommend repleting appropriately . Fibrinogen 91-> 169. PT/PTT/INR: 13.6/1.17/30.5. Activate labs q 6   Pulm: Saturating well on room air, encourage oob/amb, encourage use of incentive spirometry  GI: NPO  :Barajas in place   Heme: ambulation and SCDs for DVT ppx. Heparin held 2/2 DIC   FEN: LR@75  replete electrolytes prn   ID: Afebrile now, last fever 38.2. Patient currently on Invanz  Endo: No active issues   Dispo: Continue routine postpartum care, appreciate excellent SICU care . If patients labs do not increase appropriately despite resuscitation consider IR consult    ABRAHAM Powell  PGY-3             43y/o   PPD#1 from  c/b PPH requiring Sindy placement and uterotonics for EBL 2l leading to DIC . Intrapartum course complicated by chorio Patient stable , currently in SICU for hemodynamic monitoring     Neuro: Pain well controlled. PO pain meds, Tylenol and Oxycodone for pain control  CV: Labs from 6PM showing H/H 7.8/23.2, Recommend repleting appropriately . Fibrinogen 91-> 169. PT/PTT/INR: 13.6/1.17/30.5. Activatable labs q 6   Pulm: Saturating well on room air, encourage oob/amb, encourage use of incentive spirometry  GI: NPO  :Barajas in place   Heme: ambulation and SCDs for DVT ppx. Heparin held 2/2 DIC. Monitor vaginal bleeding    FEN: LR@75  replete electrolytes prn   ID: Afebrile now, Tmax T:39.4. Patient currently on Invanz for Sindy placement and chorio  Endo: No active issues   OB: Sindy removed , monitor vaginal bleeding    Dispo: Continue routine postpartum care, appreciate excellent SICU care . Recommend transfusing PRBCs  If patients labs do not increase appropriately despite resuscitation consider IR consult     Total Replacement as per PPH Worksheet : 1 PRBC, 4 Albumin, 1 Cryo   In SICU patient received [id number] Powell  PGY-3             41y/o   PPD#1 from  c/b PPH requiring Sindy placement and uterotonics for EBL 2l leading to DIC . Intrapartum course complicated by chorio Patient stable , currently in SICU for hemodynamic monitoring     Neuro: Pain well controlled. PO pain meds, Tylenol and Oxycodone for pain control  CV: Labs from 6PM showing H/H 7.8/23.2, Recommend repleting appropriately . Fibrinogen 91-> 169. PT/PTT/INR: 13.6/1.17/30.5. Activatable labs q 6 , 12am labs are specimen received, awaiting results   Pulm: Saturating well on room air, encourage oob/amb, encourage use of incentive spirometry  GI: NPO  :Barajas in place   Heme: ambulation and SCDs for DVT ppx. Heparin held 2/2 DIC. Monitor vaginal bleeding    FEN: LR@75  replete electrolytes prn   ID: Afebrile now, Tmax T:39.4. Patient currently on Invanz for Sindy placement and chorio  Endo: No active issues   OB: Sindy removed , monitor vaginal bleeding    Dispo: Continue routine postpartum care, appreciate excellent SICU care . Recommend transfusing PRBCs  If patients labs do not increase appropriately despite resuscitation consider IR consult     Total Replacement as per PPH Worksheet : 1 PRBC, 4 Albumin, 1 Cryo   In SICU patient received 1FFP     ABRAHAM Powell  PGY-3

## 2023-02-03 NOTE — PROGRESS NOTE ADULT - SUBJECTIVE AND OBJECTIVE BOX
POST ANESTHESIA EVALUATION    42y Female POSTPARTUM DAY 1     MENTAL STATUS: Patient participation [ X ] Awake     [  ] Arousable     [  ] Sedated    AIRWAY PATENCY: [ x ] Satisfactory  [  ] Other:     Vital Signs Last 24 Hrs  T(C): 36 (03 Feb 2023 08:00), Max: 39.4 (02 Feb 2023 12:24)  T(F): 96.8 (03 Feb 2023 08:00), Max: 102.9 (02 Feb 2023 12:24)  HR: 73 (03 Feb 2023 09:00) (73 - 166)  BP: 113/72 (03 Feb 2023 09:00) (87/63 - 150/70)  BP(mean): 85 (03 Feb 2023 09:00) (70 - 95)  RR: 19 (03 Feb 2023 09:00) (16 - 28)  SpO2: 95% (03 Feb 2023 09:00) (89% - 100%)    Parameters below as of 03 Feb 2023 08:00  Patient On (Oxygen Delivery Method): room air      I&O's Summary    02 Feb 2023 07:01  -  03 Feb 2023 07:00  --------------------------------------------------------  IN: 1225 mL / OUT: 835 mL / NET: 390 mL    03 Feb 2023 07:01  -  03 Feb 2023 12:17  --------------------------------------------------------  IN: 150 mL / OUT: 140 mL / NET: 10 mL            Pt s/p PPH. Labs reviewed. Epidural cath removed  - tip intact.  NO APPARENT ANESTHESIA COMPLICATIONS

## 2023-02-03 NOTE — DIETITIAN INITIAL EVALUATION ADULT - ADD RECOMMEND
1. Prenatal multivitamin.  2. Honor food preferences.   3. Encourage po intakes.  1. Continue Prenatal multivitamin.  2. Honor food preferences.   3. Encourage po intakes.

## 2023-02-04 ENCOUNTER — TRANSCRIPTION ENCOUNTER (OUTPATIENT)
Age: 43
End: 2023-02-04

## 2023-02-04 LAB
ALBUMIN SERPL ELPH-MCNC: 3.1 G/DL — LOW (ref 3.3–5)
ALBUMIN SERPL ELPH-MCNC: 3.1 G/DL — LOW (ref 3.3–5)
ALP SERPL-CCNC: 77 U/L — SIGNIFICANT CHANGE UP (ref 40–120)
ALP SERPL-CCNC: 82 U/L — SIGNIFICANT CHANGE UP (ref 40–120)
ALT FLD-CCNC: 108 U/L — HIGH (ref 4–33)
ALT FLD-CCNC: 82 U/L — HIGH (ref 4–33)
ANION GAP SERPL CALC-SCNC: 11 MMOL/L — SIGNIFICANT CHANGE UP (ref 7–14)
ANION GAP SERPL CALC-SCNC: 9 MMOL/L — SIGNIFICANT CHANGE UP (ref 7–14)
APTT BLD: 28 SEC — SIGNIFICANT CHANGE UP (ref 27–36.3)
AST SERPL-CCNC: 124 U/L — HIGH (ref 4–32)
AST SERPL-CCNC: 129 U/L — HIGH (ref 4–32)
BASOPHILS # BLD AUTO: 0.03 K/UL — SIGNIFICANT CHANGE UP (ref 0–0.2)
BASOPHILS NFR BLD AUTO: 0.2 % — SIGNIFICANT CHANGE UP (ref 0–2)
BILIRUB SERPL-MCNC: 0.2 MG/DL — SIGNIFICANT CHANGE UP (ref 0.2–1.2)
BILIRUB SERPL-MCNC: 0.2 MG/DL — SIGNIFICANT CHANGE UP (ref 0.2–1.2)
BUN SERPL-MCNC: 17 MG/DL — SIGNIFICANT CHANGE UP (ref 7–23)
BUN SERPL-MCNC: 19 MG/DL — SIGNIFICANT CHANGE UP (ref 7–23)
CALCIUM SERPL-MCNC: 8.9 MG/DL — SIGNIFICANT CHANGE UP (ref 8.4–10.5)
CALCIUM SERPL-MCNC: 9 MG/DL — SIGNIFICANT CHANGE UP (ref 8.4–10.5)
CHLORIDE SERPL-SCNC: 104 MMOL/L — SIGNIFICANT CHANGE UP (ref 98–107)
CHLORIDE SERPL-SCNC: 106 MMOL/L — SIGNIFICANT CHANGE UP (ref 98–107)
CO2 SERPL-SCNC: 22 MMOL/L — SIGNIFICANT CHANGE UP (ref 22–31)
CO2 SERPL-SCNC: 23 MMOL/L — SIGNIFICANT CHANGE UP (ref 22–31)
CREAT SERPL-MCNC: 0.65 MG/DL — SIGNIFICANT CHANGE UP (ref 0.5–1.3)
CREAT SERPL-MCNC: 0.69 MG/DL — SIGNIFICANT CHANGE UP (ref 0.5–1.3)
EGFR: 112 ML/MIN/1.73M2 — SIGNIFICANT CHANGE UP
EGFR: 113 ML/MIN/1.73M2 — SIGNIFICANT CHANGE UP
EOSINOPHIL # BLD AUTO: 0.1 K/UL — SIGNIFICANT CHANGE UP (ref 0–0.5)
EOSINOPHIL NFR BLD AUTO: 0.7 % — SIGNIFICANT CHANGE UP (ref 0–6)
GLUCOSE SERPL-MCNC: 113 MG/DL — HIGH (ref 70–99)
GLUCOSE SERPL-MCNC: 89 MG/DL — SIGNIFICANT CHANGE UP (ref 70–99)
HCT VFR BLD CALC: 22.9 % — LOW (ref 34.5–45)
HGB BLD-MCNC: 7.5 G/DL — LOW (ref 11.5–15.5)
IANC: 11.51 K/UL — HIGH (ref 1.8–7.4)
IMM GRANULOCYTES NFR BLD AUTO: 0.8 % — SIGNIFICANT CHANGE UP (ref 0–0.9)
INR BLD: 0.91 RATIO — SIGNIFICANT CHANGE UP (ref 0.88–1.16)
LYMPHOCYTES # BLD AUTO: 1.61 K/UL — SIGNIFICANT CHANGE UP (ref 1–3.3)
LYMPHOCYTES # BLD AUTO: 11.3 % — LOW (ref 13–44)
MCHC RBC-ENTMCNC: 31.9 PG — SIGNIFICANT CHANGE UP (ref 27–34)
MCHC RBC-ENTMCNC: 32.8 GM/DL — SIGNIFICANT CHANGE UP (ref 32–36)
MCV RBC AUTO: 97.4 FL — SIGNIFICANT CHANGE UP (ref 80–100)
MONOCYTES # BLD AUTO: 0.84 K/UL — SIGNIFICANT CHANGE UP (ref 0–0.9)
MONOCYTES NFR BLD AUTO: 5.9 % — SIGNIFICANT CHANGE UP (ref 2–14)
NEUTROPHILS # BLD AUTO: 11.51 K/UL — HIGH (ref 1.8–7.4)
NEUTROPHILS NFR BLD AUTO: 81.1 % — HIGH (ref 43–77)
NRBC # BLD: 0 /100 WBCS — SIGNIFICANT CHANGE UP (ref 0–0)
NRBC # FLD: 0 K/UL — SIGNIFICANT CHANGE UP (ref 0–0)
PLATELET # BLD AUTO: 132 K/UL — LOW (ref 150–400)
POTASSIUM SERPL-MCNC: 3.8 MMOL/L — SIGNIFICANT CHANGE UP (ref 3.5–5.3)
POTASSIUM SERPL-MCNC: 4 MMOL/L — SIGNIFICANT CHANGE UP (ref 3.5–5.3)
POTASSIUM SERPL-SCNC: 3.8 MMOL/L — SIGNIFICANT CHANGE UP (ref 3.5–5.3)
POTASSIUM SERPL-SCNC: 4 MMOL/L — SIGNIFICANT CHANGE UP (ref 3.5–5.3)
PROT SERPL-MCNC: 5.5 G/DL — LOW (ref 6–8.3)
PROT SERPL-MCNC: 5.6 G/DL — LOW (ref 6–8.3)
PROTHROM AB SERPL-ACNC: 10.5 SEC — SIGNIFICANT CHANGE UP (ref 10.5–13.4)
RBC # BLD: 2.35 M/UL — LOW (ref 3.8–5.2)
RBC # FLD: 15.1 % — HIGH (ref 10.3–14.5)
SODIUM SERPL-SCNC: 137 MMOL/L — SIGNIFICANT CHANGE UP (ref 135–145)
SODIUM SERPL-SCNC: 138 MMOL/L — SIGNIFICANT CHANGE UP (ref 135–145)
WBC # BLD: 14.2 K/UL — HIGH (ref 3.8–10.5)
WBC # FLD AUTO: 14.2 K/UL — HIGH (ref 3.8–10.5)

## 2023-02-04 RX ORDER — ACETAMINOPHEN 500 MG
2 TABLET ORAL
Qty: 0 | Refills: 0 | DISCHARGE

## 2023-02-04 RX ORDER — OMEGA-3 ACID ETHYL ESTERS 1 G
1 CAPSULE ORAL
Qty: 0 | Refills: 0 | DISCHARGE

## 2023-02-04 RX ORDER — SODIUM CHLORIDE 9 MG/ML
500 INJECTION, SOLUTION INTRAVENOUS ONCE
Refills: 0 | Status: COMPLETED | OUTPATIENT
Start: 2023-02-04 | End: 2023-02-04

## 2023-02-04 RX ORDER — IBUPROFEN 200 MG
600 TABLET ORAL EVERY 6 HOURS
Refills: 0 | Status: DISCONTINUED | OUTPATIENT
Start: 2023-02-04 | End: 2023-02-07

## 2023-02-04 RX ADMIN — Medication 975 MILLIGRAM(S): at 09:36

## 2023-02-04 RX ADMIN — Medication 975 MILLIGRAM(S): at 08:40

## 2023-02-04 RX ADMIN — SODIUM CHLORIDE 1000 MILLILITER(S): 9 INJECTION, SOLUTION INTRAVENOUS at 05:40

## 2023-02-04 RX ADMIN — SODIUM CHLORIDE 3 MILLILITER(S): 9 INJECTION INTRAMUSCULAR; INTRAVENOUS; SUBCUTANEOUS at 12:50

## 2023-02-04 RX ADMIN — HEPARIN SODIUM 5000 UNIT(S): 5000 INJECTION INTRAVENOUS; SUBCUTANEOUS at 23:51

## 2023-02-04 RX ADMIN — Medication 975 MILLIGRAM(S): at 14:29

## 2023-02-04 RX ADMIN — Medication 975 MILLIGRAM(S): at 13:54

## 2023-02-04 RX ADMIN — Medication 975 MILLIGRAM(S): at 03:00

## 2023-02-04 RX ADMIN — Medication 1 TABLET(S): at 12:40

## 2023-02-04 RX ADMIN — SODIUM CHLORIDE 3 MILLILITER(S): 9 INJECTION INTRAMUSCULAR; INTRAVENOUS; SUBCUTANEOUS at 06:03

## 2023-02-04 RX ADMIN — ERTAPENEM SODIUM 120 MILLIGRAM(S): 1 INJECTION, POWDER, LYOPHILIZED, FOR SOLUTION INTRAMUSCULAR; INTRAVENOUS at 12:40

## 2023-02-04 RX ADMIN — Medication 975 MILLIGRAM(S): at 02:21

## 2023-02-04 RX ADMIN — HEPARIN SODIUM 5000 UNIT(S): 5000 INJECTION INTRAVENOUS; SUBCUTANEOUS at 05:18

## 2023-02-04 NOTE — DISCHARGE NOTE OB - HOSPITAL COURSE
the pt underwent IOL for chronic htn, she progressed to full dilation with fetal tachycardia just minutes before delivery. She had  with intact perineum, bloody amniotic fluid strongly suspicious for abruption, quick delivery of placenta followed by post partum hemorrhage, given extra pitocin, TXA, cytotec, hemabate, She had temp 39.4 within the hour post delivery, received packed rbcs, albumen and when her fibrinogen resulted low, she received 2 units cryoprecipitate. Sindy was placed. She was transferred to SICU where she continued to receive blood products. DIC thought to be resultant from abruption and chorioamnionitis. She was given antibiotics and tylenol and did well in SICU as Sindy was removed.  She was transferred to postpartum floor on PPD #1 and continued to recover. She delivered a female  with Apgars 7,9. Placenta sent to pathology the pt underwent IOL for chronic htn, she progressed to full dilation with fetal tachycardia just minutes before delivery. She had  with intact perineum, bloody amniotic fluid strongly suspicious for abruption, quick delivery of placenta followed by post partum hemorrhage, given extra pitocin, TXA, cytotec, hemabate, She had temp 39.4 within the hour post delivery, received packed rbcs, albumen and when her fibrinogen resulted low, she received 2 units cryoprecipitate. Sindy was placed. She was transferred to SICU where she continued to receive blood products. DIC thought to be resultant from abruption and chorioamnionitis. She was given antibiotics and tylenol and did well in SICU as Sindy was removed.  She was transferred to postpartum floor on PPD #1 and continued to recover. She delivered a female  with Apgars 7,9. Placenta sent to pathology  Patient developed increase LFTS PPD2 and elevated BPs and was treated with magnesium and started on procardia

## 2023-02-04 NOTE — DISCHARGE NOTE OB - CARE PLAN
1 Principal Discharge DX:	Normal vaginal delivery  Assessment and plan of treatment:	stable, routine postpartum care  Secondary Diagnosis:	Acute blood loss anemia  Secondary Diagnosis:	Sepsis with disseminated intravascular coagulation without septic shock

## 2023-02-04 NOTE — PROGRESS NOTE ADULT - ASSESSMENT
43y/o  PPD#2 from  c/b PPH requiring Sindy placement and uterotonics for EBL 2L leading to DIC . Intrapartum course complicated by chorioamnionitis. Patient is stable on the postpartum floor and recovering well.    #PPH c/b DIC  - s/p 1uPRBC, 2uCryo, 1uFFP, 4uAlbumin  - H/H 12.4/36.5->>>>7.4/21.8  - Pending AM CBC  - Fibrinogen 551->>91->>246   - s/p SINDY  - f/u AM CBC/CMP/Coags/Lactate/Fibrinogen    #Chorio  - Tmax T:39.4  - 1 more dose of Invanz today    #Postpartum state  - Continue with po analgesia  - Increase ambulation  - Continue regular diet  - IV lock  - No labs    Sherri Ng PGY1 43y/o  PPD#2 from  c/b PPH requiring Sindy placement and uterotonics for EBL 2L leading to DIC . Intrapartum course complicated by chorioamnionitis. Patient is stable on the postpartum floor and recovering well.    #PPH c/b DIC  - s/p 1uPRBC, 2uCryo, 1uFFP, 4uAlbumin  - H/H 12.4/36.5->>>>7.4/21.8->7.5/22.9  - Fibrinogen 551->>91->>246->399  - AST/ALT /->>>124/82  - s/p SINDY  - f/u AM CBC/CMP/Coags/Lactate/Fibrinogen    #Chorio  - Tmax T:39.4  - s/p Invanz x2  - WBC 7.95->>>18.09->>14.2  - 1 more dose of Invanz today    #Postpartum state  - Continue with po analgesia  - Increase ambulation  - Continue regular diet  - IV lock    Sherri Ng PGY1

## 2023-02-04 NOTE — DISCHARGE NOTE OB - CARE PROVIDER_API CALL
Liudmila Lea (MD)  Obstetrics and Gynecology  [facility] [facility], [place] [address]  Phone: [phone]  Fax: [phone]  Follow Up Time:

## 2023-02-04 NOTE — PROGRESS NOTE ADULT - SUBJECTIVE AND OBJECTIVE BOX
R1 Progress Note    Patient seen and examined at bedside, no acute overnight events. She reports that she is feeling so much better this morning and has no acute complaints, pain well controlled. Patient is ambulating, voiding spontaneously, passing gas, had a bowel movement, and tolerating regular diet. Denies CP, SOB, N/V, HA, blurred vision, fever, chills, dizziness, lightheadedness. Bleeding minimal.    Vital Signs Last 24 Hours  T(C): 36.7 (02-04-23 @ 06:00), Max: 36.8 (02-03-23 @ 21:17)  HR: 78 (02-04-23 @ 06:00) (73 - 96)  BP: 122/75 (02-04-23 @ 06:00) (105/66 - 128/66)  RR: 18 (02-04-23 @ 06:00) (17 - 24)  SpO2: 99% (02-04-23 @ 06:00) (93% - 99%)    Physical Exam:  General: NAD  Abdomen: Soft, non-tender, non-distended, fundus firm  Pelvic: Lochia wnl    Labs:    Blood Type: O Positive  Antibody Screen: --  RPR: Negative               7.4    14.47 )-----------( 100      ( 02-03 @ 06:56 )             21.8                7.7    18.09 )-----------( 89       ( 02-03 @ 00:58 )             23.2                7.8    17.10 )-----------( 113      ( 02-02 @ 18:00 )             23.2         MEDICATIONS  (STANDING):  acetaminophen     Tablet .. 975 milliGRAM(s) Oral <User Schedule>  diphenoxylate/atropine 2 Tablet(s) Oral once  diphtheria/tetanus/pertussis (acellular) Vaccine (Adacel) 0.5 milliLiter(s) IntraMuscular once  ertapenem  IVPB 1000 milliGRAM(s) IV Intermittent once  heparin   Injectable 5000 Unit(s) SubCutaneous every 12 hours  ibuprofen  Tablet. 600 milliGRAM(s) Oral every 6 hours  prenatal multivitamin 1 Tablet(s) Oral daily  sodium chloride 0.9% lock flush 3 milliLiter(s) IV Push every 8 hours    MEDICATIONS  (PRN):  benzocaine 20%/menthol 0.5% Spray 1 Spray(s) Topical every 6 hours PRN for Perineal discomfort  dibucaine 1% Ointment 1 Application(s) Topical every 6 hours PRN Perineal discomfort  diphenhydrAMINE 25 milliGRAM(s) Oral every 6 hours PRN Pruritus  hydrocortisone 1% Cream 1 Application(s) Topical every 6 hours PRN Moderate Pain (4-6)  lanolin Ointment 1 Application(s) Topical every 6 hours PRN nipple soreness  magnesium hydroxide Suspension 30 milliLiter(s) Oral two times a day PRN Constipation  oxyCODONE    IR 5 milliGRAM(s) Oral every 3 hours PRN Moderate to Severe Pain (4-10)  oxyCODONE    IR 5 milliGRAM(s) Oral once PRN Moderate to Severe Pain (4-10)  pramoxine 1%/zinc 5% Cream 1 Application(s) Topical every 4 hours PRN Moderate Pain (4-6)  simethicone 80 milliGRAM(s) Chew every 4 hours PRN Gas  witch hazel Pads 1 Application(s) Topical every 4 hours PRN Perineal discomfort

## 2023-02-04 NOTE — DISCHARGE NOTE OB - MEDICATION SUMMARY - MEDICATIONS TO TAKE
I will START or STAY ON the medications listed below when I get home from the hospital:    Tylenol 500 mg oral tablet  -- 2 tab(s) by mouth every 6 hours, As Needed  -- Indication: For vaginal delivery    Prenatal  mg oral capsule  -- 1 cap(s) by mouth once a day  -- Indication: For vaginal delivery   I will START or STAY ON the medications listed below when I get home from the hospital:    Tylenol 500 mg oral tablet  -- 2 tab(s) by mouth every 6 hours, As Needed  -- Indication: For vaginal delivery    NIFEdipine 30 mg oral tablet, extended release  -- 1 tab(s) by mouth once a day  -- Indication: For Pre-existing essential hypertension affecting pregnancy in third trimester    Prenatal  mg oral capsule  -- 1 cap(s) by mouth once a day  -- Indication: For vaginal delivery

## 2023-02-04 NOTE — CHART NOTE - NSCHARTNOTEFT_GEN_A_CORE
R1 Chart Note    *Note delayed due to clinical duties*    Patient seen and examined at the bedside. Patient dressed and ready for discharge. Explained to the patient that her LFTs came back abnormal this morning, and that our recommendation is to get another set of LFTs to trend the direction of her liver enzymes before discharge. Patient upset about the delay, but amenable to getting repeat labs.    Repeat labs came back increasingly elevated from prior. Went to speak to the patient and her partner. Explained that with her increasing liver enzymes we are concerned for liver injury vs. preeclampsia and that our recommendation would be for the patient to stay. Emphasized the importance of making sure the patient is safe to go home and care for her baby, especially after such an eventful 48 hours. Discussed that if the patient were to be discharged and be readmitted for any reason, the baby would not be able to accompany her. Apologized to the patient and her partner that the labs were not addressed before. They were understandably very upset that the labs were not addressed before she was being discharged, but understanding of the need to make sure she is safe for discharge. All questions answered.    LFT trend: 22/14->47/15->50/15->124/82 (this AM) -> 129/108 (this PM)    Plan to get 5AM CMP.    L Hernandez PGY1  d/w Dr. Welsh R1 Chart Note    *Note delayed due to clinical duties*    Patient seen and examined at the bedside. Patient dressed and ready for discharge. Explained to the patient that her LFTs came back abnormal this morning, and that our recommendation is to get another set of LFTs to trend the direction of her liver enzymes before discharge. Patient upset about the delay, but amenable to getting repeat labs.    Repeat labs came back increasingly elevated from prior. Went to speak to the patient and her partner. Explained that with her increasing liver enzymes we are concerned for liver injury vs. preeclampsia and that our recommendation would be for the patient to stay. Emphasized the importance of making sure the patient is safe to go home and care for her baby, especially after such an eventful 48 hours. Discussed that if the patient were to be discharged and be readmitted for any reason, the baby would not be able to accompany her. Apologized to the patient and her partner that the labs were not addressed before. They were understandably very upset that the labs were not addressed before she was being discharged, but understanding of the need to make sure she is safe for discharge. All questions answered.    LFT trend: 22/14->47/15->50/15->124/82 (this AM) -> 129/108 (this PM)    Plan to get 5AM CMP.    L Panella PGY1  d/w Dr. Welsh    ADD  went and explained to patient later this evening that will stop tylenol and see if LFTs improve. Patient was a lot calmer when saw her

## 2023-02-04 NOTE — DISCHARGE NOTE OB - MATERIALS PROVIDED
Vaccinations/[facility]  Screening Program/  Immunization Record/Breastfeeding Log/Bottle Feeding Log/Breastfeeding Mother’s Support Group Information/Guide to Postpartum Care/[facility] Hearing Screen Program/Breastfeeding Guide and Packet/Birth Certificate Instructions

## 2023-02-04 NOTE — DISCHARGE NOTE OB - PATIENT PORTAL LINK FT
You can access the FollowMyHealth Patient Portal offered by [facility] by registering at the following website: [email or web address]. By joining [employer]s FollowMyHealth portal, you will also be able to view your health information using other applications (apps) compatible with our system.

## 2023-02-05 LAB
ALBUMIN SERPL ELPH-MCNC: 3 G/DL — LOW (ref 3.3–5)
ALP SERPL-CCNC: 83 U/L — SIGNIFICANT CHANGE UP (ref 40–120)
ALT FLD-CCNC: 119 U/L — HIGH (ref 4–33)
ANION GAP SERPL CALC-SCNC: 11 MMOL/L — SIGNIFICANT CHANGE UP (ref 7–14)
ANISOCYTOSIS BLD QL: SLIGHT — SIGNIFICANT CHANGE UP
APTT BLD: 29.2 SEC — SIGNIFICANT CHANGE UP (ref 27–36.3)
AST SERPL-CCNC: 106 U/L — HIGH (ref 4–32)
BASOPHILS # BLD AUTO: 0.09 K/UL — SIGNIFICANT CHANGE UP (ref 0–0.2)
BASOPHILS NFR BLD AUTO: 0.9 % — SIGNIFICANT CHANGE UP (ref 0–2)
BILIRUB SERPL-MCNC: 0.2 MG/DL — SIGNIFICANT CHANGE UP (ref 0.2–1.2)
BUN SERPL-MCNC: 19 MG/DL — SIGNIFICANT CHANGE UP (ref 7–23)
CALCIUM SERPL-MCNC: 9 MG/DL — SIGNIFICANT CHANGE UP (ref 8.4–10.5)
CHLORIDE SERPL-SCNC: 104 MMOL/L — SIGNIFICANT CHANGE UP (ref 98–107)
CO2 SERPL-SCNC: 22 MMOL/L — SIGNIFICANT CHANGE UP (ref 22–31)
CREAT SERPL-MCNC: 0.62 MG/DL — SIGNIFICANT CHANGE UP (ref 0.5–1.3)
EGFR: 115 ML/MIN/1.73M2 — SIGNIFICANT CHANGE UP
EOSINOPHIL # BLD AUTO: 0.09 K/UL — SIGNIFICANT CHANGE UP (ref 0–0.5)
EOSINOPHIL NFR BLD AUTO: 0.9 % — SIGNIFICANT CHANGE UP (ref 0–6)
FIBRINOGEN PPP-MCNC: 334 MG/DL — SIGNIFICANT CHANGE UP (ref 200–465)
GLUCOSE SERPL-MCNC: 104 MG/DL — HIGH (ref 70–99)
HCT VFR BLD CALC: 21.4 % — LOW (ref 34.5–45)
HGB BLD-MCNC: 7.1 G/DL — LOW (ref 11.5–15.5)
IANC: 7.01 K/UL — SIGNIFICANT CHANGE UP (ref 1.8–7.4)
INR BLD: <0.9 RATIO — LOW (ref 0.88–1.16)
LDH SERPL L TO P-CCNC: 375 U/L — HIGH (ref 135–225)
LYMPHOCYTES # BLD AUTO: 1.1 K/UL — SIGNIFICANT CHANGE UP (ref 1–3.3)
LYMPHOCYTES # BLD AUTO: 11.5 % — LOW (ref 13–44)
MACROCYTES BLD QL: SLIGHT — SIGNIFICANT CHANGE UP
MAGNESIUM SERPL-MCNC: 4.8 MG/DL — HIGH (ref 1.6–2.6)
MAGNESIUM SERPL-MCNC: 5.6 MG/DL — HIGH (ref 1.6–2.6)
MCHC RBC-ENTMCNC: 32.1 PG — SIGNIFICANT CHANGE UP (ref 27–34)
MCHC RBC-ENTMCNC: 33.2 GM/DL — SIGNIFICANT CHANGE UP (ref 32–36)
MCV RBC AUTO: 96.8 FL — SIGNIFICANT CHANGE UP (ref 80–100)
METAMYELOCYTES # FLD: 0.9 % — SIGNIFICANT CHANGE UP (ref 0–1)
MICROCYTES BLD QL: SLIGHT — SIGNIFICANT CHANGE UP
MONOCYTES # BLD AUTO: 0.09 K/UL — SIGNIFICANT CHANGE UP (ref 0–0.9)
MONOCYTES NFR BLD AUTO: 0.9 % — LOW (ref 2–14)
NEUTROPHILS # BLD AUTO: 7.86 K/UL — HIGH (ref 1.8–7.4)
NEUTROPHILS NFR BLD AUTO: 81.4 % — HIGH (ref 43–77)
NEUTS BAND # BLD: 0.9 % — SIGNIFICANT CHANGE UP (ref 0–6)
PLAT MORPH BLD: NORMAL — SIGNIFICANT CHANGE UP
PLATELET # BLD AUTO: 165 K/UL — SIGNIFICANT CHANGE UP (ref 150–400)
PLATELET COUNT - ESTIMATE: NORMAL — SIGNIFICANT CHANGE UP
POLYCHROMASIA BLD QL SMEAR: SLIGHT — SIGNIFICANT CHANGE UP
POTASSIUM SERPL-MCNC: 4.1 MMOL/L — SIGNIFICANT CHANGE UP (ref 3.5–5.3)
POTASSIUM SERPL-SCNC: 4.1 MMOL/L — SIGNIFICANT CHANGE UP (ref 3.5–5.3)
PROT SERPL-MCNC: 5.4 G/DL — LOW (ref 6–8.3)
PROTHROM AB SERPL-ACNC: 10.1 SEC — LOW (ref 10.5–13.4)
RBC # BLD: 2.21 M/UL — LOW (ref 3.8–5.2)
RBC # FLD: 14.7 % — HIGH (ref 10.3–14.5)
RBC BLD AUTO: NORMAL — SIGNIFICANT CHANGE UP
SODIUM SERPL-SCNC: 137 MMOL/L — SIGNIFICANT CHANGE UP (ref 135–145)
URATE SERPL-MCNC: 6.1 MG/DL — SIGNIFICANT CHANGE UP (ref 2.5–7)
VARIANT LYMPHS # BLD: 2.6 % — SIGNIFICANT CHANGE UP (ref 0–6)
WBC # BLD: 9.55 K/UL — SIGNIFICANT CHANGE UP (ref 3.8–10.5)
WBC # FLD AUTO: 9.55 K/UL — SIGNIFICANT CHANGE UP (ref 3.8–10.5)

## 2023-02-05 RX ORDER — MAGNESIUM SULFATE 500 MG/ML
2 VIAL (ML) INJECTION
Qty: 40 | Refills: 0 | Status: COMPLETED | OUTPATIENT
Start: 2023-02-05 | End: 2023-02-06

## 2023-02-05 RX ORDER — SODIUM CHLORIDE 9 MG/ML
1000 INJECTION, SOLUTION INTRAVENOUS
Refills: 0 | Status: DISCONTINUED | OUTPATIENT
Start: 2023-02-05 | End: 2023-02-07

## 2023-02-05 RX ORDER — MAGNESIUM SULFATE 500 MG/ML
4 VIAL (ML) INJECTION ONCE
Refills: 0 | Status: COMPLETED | OUTPATIENT
Start: 2023-02-05 | End: 2023-02-05

## 2023-02-05 RX ADMIN — HEPARIN SODIUM 5000 UNIT(S): 5000 INJECTION INTRAVENOUS; SUBCUTANEOUS at 23:33

## 2023-02-05 RX ADMIN — Medication 50 GM/HR: at 19:08

## 2023-02-05 RX ADMIN — Medication 600 MILLIGRAM(S): at 11:55

## 2023-02-05 RX ADMIN — Medication 300 GRAM(S): at 10:04

## 2023-02-05 RX ADMIN — Medication 600 MILLIGRAM(S): at 18:30

## 2023-02-05 RX ADMIN — Medication 600 MILLIGRAM(S): at 17:59

## 2023-02-05 RX ADMIN — HEPARIN SODIUM 5000 UNIT(S): 5000 INJECTION INTRAVENOUS; SUBCUTANEOUS at 11:54

## 2023-02-05 RX ADMIN — SODIUM CHLORIDE 3 MILLILITER(S): 9 INJECTION INTRAMUSCULAR; INTRAVENOUS; SUBCUTANEOUS at 22:00

## 2023-02-05 RX ADMIN — Medication 600 MILLIGRAM(S): at 12:45

## 2023-02-05 RX ADMIN — Medication 1 TABLET(S): at 11:54

## 2023-02-05 RX ADMIN — Medication 600 MILLIGRAM(S): at 23:33

## 2023-02-05 RX ADMIN — Medication 50 GM/HR: at 10:30

## 2023-02-05 NOTE — PROVIDER CONTACT NOTE (OTHER) - ASSESSMENT
Temp: 98   HR: 83  BP: 151/77  100 o2
Pt due to void post mercedes removal at 1700. Pt resting comfortably in bed. Fundus midline and firm. Bleeding WNL. Pt voided 100 cc, but feels only trickles of urine coming out. Bladder scanner showed 18cc in bladder.

## 2023-02-05 NOTE — PROVIDER CONTACT NOTE (OTHER) - ACTION/TREATMENT ORDERED:
Post delivery labs drawn. Benadryl 25 IV, Invanz 1g IV, and Ofirmev IV administered. Sindy and mercedes catheter inserted by MD. Patient transfused with Albumin (x3), PRBC's (x1), and Cryo (x2) on L&D.
Pt tachypneic, tachycardic, and febrile. Septic labs drawn and sent per MD order. Pt assessed by Dr. Gomez for SICU admission at 13:15. Pt stabilized on L&D and transferred at 16:40. Refer to MD note.
MD aware, Pt to be evaluated during rounds
Cytotec 1000 mg MD, Oxytocin 20 mu added to 1000 cc bag, TXA IV, Hemabate IM administered with minimal intermittent improvement in uterine contractility. Dr. Mitchell called to bedside to assist.
Per MD Ulisses Sloan, no new orders given. Continue to monitor urine output and encourage PO hydration

## 2023-02-05 NOTE — PROVIDER CONTACT NOTE (OTHER) - SITUATION
Patient has elevated BP
Pt due to void post mercedes catheter removal. Pt only able to void 100 cc of urine. Pt previously voided at 1836, but was not measured. Pt states she felt like it was a lot, but not measured.
Pt with spontaneous vaginal delivery at 11:37 a.m. Placenta delivered at 11:39 a.m. with placental abruption noted by Dr. Marsh. Vaginal bleeding and uterine atony present as per MD. Temp 37.7.

## 2023-02-05 NOTE — PROVIDER CONTACT NOTE (OTHER) - BACKGROUND
pt is a NSD from 2/2 @1137 with a QBL of 1376. pt is a SICU tx for placenta abruption. She is s/p samir, 2 PRBC, Cryo, FFP and albumin. She received TXA, Cytotec, extra pit, Lomotil and Hemabate.
Nsd 2/2 @ 1137. QBL 1376- pt s/p TXA, Hemabate, Xtra Pit, OK Cytotec. Pt received 2U PRBC, 3 Albumin, 4cryo, and 1FFP. Pt is s/p SICU and s/p AC. Barajas catheter removed @ 1700 on 2/3.

## 2023-02-06 LAB
ALBUMIN SERPL ELPH-MCNC: 3.1 G/DL — LOW (ref 3.3–5)
ALP SERPL-CCNC: 91 U/L — SIGNIFICANT CHANGE UP (ref 40–120)
ALT FLD-CCNC: 109 U/L — HIGH (ref 4–33)
ANION GAP SERPL CALC-SCNC: 14 MMOL/L — SIGNIFICANT CHANGE UP (ref 7–14)
APTT BLD: 27.7 SEC — SIGNIFICANT CHANGE UP (ref 27–36.3)
AST SERPL-CCNC: 92 U/L — HIGH (ref 4–32)
BASOPHILS # BLD AUTO: 0.08 K/UL — SIGNIFICANT CHANGE UP (ref 0–0.2)
BASOPHILS NFR BLD AUTO: 0.8 % — SIGNIFICANT CHANGE UP (ref 0–2)
BILIRUB SERPL-MCNC: 0.3 MG/DL — SIGNIFICANT CHANGE UP (ref 0.2–1.2)
BUN SERPL-MCNC: 10 MG/DL — SIGNIFICANT CHANGE UP (ref 7–23)
CALCIUM SERPL-MCNC: 7.2 MG/DL — LOW (ref 8.4–10.5)
CHLORIDE SERPL-SCNC: 102 MMOL/L — SIGNIFICANT CHANGE UP (ref 98–107)
CO2 SERPL-SCNC: 23 MMOL/L — SIGNIFICANT CHANGE UP (ref 22–31)
CREAT SERPL-MCNC: 0.63 MG/DL — SIGNIFICANT CHANGE UP (ref 0.5–1.3)
EGFR: 114 ML/MIN/1.73M2 — SIGNIFICANT CHANGE UP
EOSINOPHIL # BLD AUTO: 0.18 K/UL — SIGNIFICANT CHANGE UP (ref 0–0.5)
EOSINOPHIL NFR BLD AUTO: 1.7 % — SIGNIFICANT CHANGE UP (ref 0–6)
FIBRINOGEN PPP-MCNC: 340 MG/DL — SIGNIFICANT CHANGE UP (ref 200–465)
GLUCOSE SERPL-MCNC: 89 MG/DL — SIGNIFICANT CHANGE UP (ref 70–99)
HCT VFR BLD CALC: 23.3 % — LOW (ref 34.5–45)
HGB BLD-MCNC: 7.5 G/DL — LOW (ref 11.5–15.5)
IANC: 7.21 K/UL — SIGNIFICANT CHANGE UP (ref 1.8–7.4)
IMM GRANULOCYTES NFR BLD AUTO: 7 % — HIGH (ref 0–0.9)
INR BLD: <0.9 RATIO — LOW (ref 0.88–1.16)
LDH SERPL L TO P-CCNC: 440 U/L — HIGH (ref 135–225)
LYMPHOCYTES # BLD AUTO: 1.74 K/UL — SIGNIFICANT CHANGE UP (ref 1–3.3)
LYMPHOCYTES # BLD AUTO: 16.4 % — SIGNIFICANT CHANGE UP (ref 13–44)
MAGNESIUM SERPL-MCNC: 6.5 MG/DL — HIGH (ref 1.6–2.6)
MCHC RBC-ENTMCNC: 31.5 PG — SIGNIFICANT CHANGE UP (ref 27–34)
MCHC RBC-ENTMCNC: 32.2 GM/DL — SIGNIFICANT CHANGE UP (ref 32–36)
MCV RBC AUTO: 97.9 FL — SIGNIFICANT CHANGE UP (ref 80–100)
MONOCYTES # BLD AUTO: 0.65 K/UL — SIGNIFICANT CHANGE UP (ref 0–0.9)
MONOCYTES NFR BLD AUTO: 6.1 % — SIGNIFICANT CHANGE UP (ref 2–14)
NEUTROPHILS # BLD AUTO: 7.21 K/UL — SIGNIFICANT CHANGE UP (ref 1.8–7.4)
NEUTROPHILS NFR BLD AUTO: 68 % — SIGNIFICANT CHANGE UP (ref 43–77)
NRBC # BLD: 0 /100 WBCS — SIGNIFICANT CHANGE UP (ref 0–0)
NRBC # FLD: 0.03 K/UL — HIGH (ref 0–0)
PLATELET # BLD AUTO: 193 K/UL — SIGNIFICANT CHANGE UP (ref 150–400)
POTASSIUM SERPL-MCNC: 3.8 MMOL/L — SIGNIFICANT CHANGE UP (ref 3.5–5.3)
POTASSIUM SERPL-SCNC: 3.8 MMOL/L — SIGNIFICANT CHANGE UP (ref 3.5–5.3)
PROT SERPL-MCNC: 5.4 G/DL — LOW (ref 6–8.3)
PROTHROM AB SERPL-ACNC: 9.4 SEC — LOW (ref 10.5–13.4)
RBC # BLD: 2.38 M/UL — LOW (ref 3.8–5.2)
RBC # FLD: 14.6 % — HIGH (ref 10.3–14.5)
SODIUM SERPL-SCNC: 139 MMOL/L — SIGNIFICANT CHANGE UP (ref 135–145)
URATE SERPL-MCNC: 6.2 MG/DL — SIGNIFICANT CHANGE UP (ref 2.5–7)
WBC # BLD: 10.6 K/UL — HIGH (ref 3.8–10.5)
WBC # FLD AUTO: 10.6 K/UL — HIGH (ref 3.8–10.5)

## 2023-02-06 RX ORDER — NIFEDIPINE 30 MG
30 TABLET, EXTENDED RELEASE 24 HR ORAL DAILY
Refills: 0 | Status: DISCONTINUED | OUTPATIENT
Start: 2023-02-06 | End: 2023-02-07

## 2023-02-06 RX ADMIN — HEPARIN SODIUM 5000 UNIT(S): 5000 INJECTION INTRAVENOUS; SUBCUTANEOUS at 23:41

## 2023-02-06 RX ADMIN — SODIUM CHLORIDE 3 MILLILITER(S): 9 INJECTION INTRAMUSCULAR; INTRAVENOUS; SUBCUTANEOUS at 21:45

## 2023-02-06 RX ADMIN — SODIUM CHLORIDE 3 MILLILITER(S): 9 INJECTION INTRAMUSCULAR; INTRAVENOUS; SUBCUTANEOUS at 05:40

## 2023-02-06 RX ADMIN — Medication 600 MILLIGRAM(S): at 00:00

## 2023-02-06 RX ADMIN — Medication 600 MILLIGRAM(S): at 21:15

## 2023-02-06 RX ADMIN — Medication 50 GM/HR: at 07:17

## 2023-02-06 RX ADMIN — HEPARIN SODIUM 5000 UNIT(S): 5000 INJECTION INTRAVENOUS; SUBCUTANEOUS at 12:27

## 2023-02-06 RX ADMIN — Medication 600 MILLIGRAM(S): at 20:45

## 2023-02-06 RX ADMIN — SODIUM CHLORIDE 3 MILLILITER(S): 9 INJECTION INTRAMUSCULAR; INTRAVENOUS; SUBCUTANEOUS at 15:52

## 2023-02-06 RX ADMIN — Medication 30 MILLIGRAM(S): at 12:59

## 2023-02-06 NOTE — PROGRESS NOTE ADULT - SUBJECTIVE AND OBJECTIVE BOX
OB Progress Note:  PPD#4    S: 43yo PPD#4 s/p . Patient feels well. Pain is well controlled. She is tolerating a regular diet and passing flatus. She is voiding spontaneously, and ambulating without difficulty. Denies CP/SOB. Denies lightheadedness/dizziness. Denies N/V. Pt complaining of painful axiliary masses today.     O:  Vitals:   Vital Signs Last 24 Hrs  T(C): 36.4 (2023 06:00), Max: 36.7 (2023 05:30)  T(F): 97.6 (2023 06:00), Max: 98 (2023 05:30)  HR: 85 (2023 06:00) (84 - 97)  BP: 132/76 (2023 06:00) (121/70 - 148/84)  BP(mean): --  RR: 18 (2023 06:00) (17 - 20)  SpO2: 97% (2023 06:00) (95% - 100%)        MEDICATIONS  (STANDING):  diphtheria/tetanus/pertussis (acellular) Vaccine (Adacel) 0.5 milliLiter(s) IntraMuscular once  heparin   Injectable 5000 Unit(s) SubCutaneous every 12 hours  ibuprofen  Tablet. 600 milliGRAM(s) Oral every 6 hours  lactated ringers. 1000 milliLiter(s) (50 mL/Hr) IV Continuous <Continuous>  prenatal multivitamin 1 Tablet(s) Oral daily  sodium chloride 0.9% lock flush 3 milliLiter(s) IV Push every 8 hours    MEDICATIONS  (PRN):  benzocaine 20%/menthol 0.5% Spray 1 Spray(s) Topical every 6 hours PRN for Perineal discomfort  dibucaine 1% Ointment 1 Application(s) Topical every 6 hours PRN Perineal discomfort  diphenhydrAMINE 25 milliGRAM(s) Oral every 6 hours PRN Pruritus  hydrocortisone 1% Cream 1 Application(s) Topical every 6 hours PRN Moderate Pain (4-6)  lanolin Ointment 1 Application(s) Topical every 6 hours PRN nipple soreness  magnesium hydroxide Suspension 30 milliLiter(s) Oral two times a day PRN Constipation  oxyCODONE    IR 5 milliGRAM(s) Oral every 3 hours PRN Moderate to Severe Pain (4-10)  oxyCODONE    IR 5 milliGRAM(s) Oral once PRN Moderate to Severe Pain (4-10)  pramoxine 1%/zinc 5% Cream 1 Application(s) Topical every 4 hours PRN Moderate Pain (4-6)  simethicone 80 milliGRAM(s) Chew every 4 hours PRN Gas  witch hazel Pads 1 Application(s) Topical every 4 hours PRN Perineal discomfort      Labs:  Blood type: O Positive  Rubella IgG: RPR: Negative                          7.5<L>   10.60<H> >-----------< 193    (  05:23 )             23.3<L>                        7.1<L>   9.55 >-----------< 165    (  @ 09:48 )             21.4<L>                        7.5<L>   14.20<H> >-----------< 132<L>    (  06:48 )             22.9<L>    23 @ 05:23      139  |  102  |  10  ----------------------------<  89  3.8   |  23  |  0.63    23 @ 06:29      137  |  104  |  19  ----------------------------<  104<H>  4.1   |  22  |  0.62    23 @ 17:19      138  |  106  |  19  ----------------------------<  113<H>  4.0   |  23  |  0.65    23 @ 06:48      137  |  104  |  17  ----------------------------<  89  3.8   |  22  |  0.69        Ca    7.2<L>      2023 05:23  Ca    9.0      2023 06:29  Ca    9.0      2023 17:19  Ca    8.9      2023 06:48  Mg     6.50<H>       Mg     5.60<H>       Mg     4.80<H>         TPro  5.4<L>  /  Alb  3.1<L>  /  TBili  0.3  /  DBili  x   /  AST  92<H>  /  ALT  109<H>  /  AlkPhos  91  23 @ 05:23  TPro  5.4<L>  /  Alb  3.0<L>  /  TBili  0.2  /  DBili  x   /  AST  106<H>  /  ALT  119<H>  /  AlkPhos  83  23 @ 06:29  TPro  5.5<L>  /  Alb  3.1<L>  /  TBili  0.2  /  DBili  x   /  AST  129<H>  /  ALT  108<H>  /  AlkPhos  82  23 @ 17:19  TPro  5.6<L>  /  Alb  3.1<L>  /  TBili  0.2  /  DBili  x   /  AST  124<H>  /  ALT  82<H>  /  AlkPhos  77  23 @ 06:48          Physical Exam:  General: NAD  Abdomen: soft, non-tender, non-distended, fundus firm  Vaginal: Lochia wnl  Extremities: L axilla with 3x3cm and 1x1cm fluctuant mass and R axilla with 1x1cm fluctuant mass, painful to palpation.

## 2023-02-06 NOTE — PROGRESS NOTE ADULT - ATTENDING COMMENTS
I agree with the detailed interval history, physical, and plan, which I have reviewed and edited where appropriate'; also agree with notes/assessment with my team on service.  I have personally examined the patient.  I was physically present for the key portions of the evaluation and management (E/M) service provided.  I reviewed all the pertinent data.  The patient is a critical care patient with life threatening hemodynamic and metabolic instability in SICU.  The SICU team has a constant risk benefit analyzes discussion and coordinating care with the primary team and all consultants.   The patient is in SICU with the chief complaint and diagnosis mentioned in the note.   The plan will be specified in the note.  41 y/o F s/p vaginal delivery complicated by uterine atony in SICU for hemorrhage watch.  AO3  Lung clear  Heart RR  Abd soflty dist  NEURO:  -Tylenol, PRN Oxycodone  RESPIRATORY:  - RA  CARDIOVASCULAR:  -Fu lactate   GASTROINTESTINAL:  --NPO  RENAL:  -Barajas   HEME:  -Q6 coags  -Q6 CBC  ID:  -S/p Ertapenem   ENDO:  - stopped oxytocin  CODE: Full code  DISPO: SICU
saw patient this AM and in setting with now rising BPs and inc LFTs, have to assume PEC and will start magnesium. Patient aware of this and explained risks of PEC and patient agreed to treatment plan
MFM ATTENDING    delayed entry secondary to patient care    PPD#1 s/p  c/b pph  samir discontinued earlier  hd stable  hct 21  uop borderline  would give 1 liter bolus, f/u uop, if becomes adequate, can remove mercedes  would transfuse additional 2u prbc if becomes tachycardic or develops symptoms of anemia.  ok to transfer to pp floor
patient seen and examined at bedside. agree with above assessment and plan
Agree with assessment and plan as stated above.  Doing well, bleeding minimal, now s/p AC.  Potential downgrade from SICU today, will re-assess for need for further pRBCs.    Tai Peters MD
saw and examined patient  doing well  urine output now adequate  patient feels well and has no complaints  reviewed discharge instructions

## 2023-02-06 NOTE — PROGRESS NOTE ADULT - ASSESSMENT
41y/o  PPD#3 from  c/b PPH requiring Sindy placement and uterotonics for EBL 2L leading to DIC . Intrapartum course complicated by chorioamnionitis. Patient is stable on the postpartum floor and recovering well. Patient was found to have elevated AST/ALTs. No RUQ pain or symptoms of liver disease at this time.    #PPH c/b DIC  - s/p 1uPRBC, 2uCryo, 1uFFP, 4uAlbumin  - H/H 12.4/36.5->>>>7.4/21.8->7.5/22.9->7.5/23.3  - Fibrinogen 551->>91->>246->399->>340  - s/p SINDY    #Chorio  - Tmax T:39.4; remains afebrile x24 hours  - s/p Invanz x3  - WBC 7.95->>>18.09->>14.2    #Transaminitis   - AST/ALT /14->>>124/82->128/108->92/109  - Hold tylenol  - In the setting of cHTN, will monitor for siPEC with severe features    #cHTN  - no home meds, not currently on antihypertensives  - BPs initially 130-140/70s  - 6am /77; patient without sxs of severe preeclampsia  - continue to monitor BPs and for symptoms    #Postpartum state  -  axiliary masses, plan to monitor symptomatically  - Continue with po analgesia  - Increase ambulation  - Continue regular diet  - IV lock    Camacho James PGY 1

## 2023-02-07 VITALS
HEART RATE: 100 BPM | OXYGEN SATURATION: 99 % | TEMPERATURE: 98 F | DIASTOLIC BLOOD PRESSURE: 82 MMHG | SYSTOLIC BLOOD PRESSURE: 118 MMHG

## 2023-02-07 LAB
ALBUMIN SERPL ELPH-MCNC: 3.3 G/DL — SIGNIFICANT CHANGE UP (ref 3.3–5)
ALP SERPL-CCNC: 94 U/L — SIGNIFICANT CHANGE UP (ref 40–120)
ALT FLD-CCNC: 94 U/L — HIGH (ref 4–33)
ANION GAP SERPL CALC-SCNC: 11 MMOL/L — SIGNIFICANT CHANGE UP (ref 7–14)
APTT BLD: 29.3 SEC — SIGNIFICANT CHANGE UP (ref 27–36.3)
AST SERPL-CCNC: 63 U/L — HIGH (ref 4–32)
BASOPHILS # BLD AUTO: 0.02 K/UL — SIGNIFICANT CHANGE UP (ref 0–0.2)
BASOPHILS NFR BLD AUTO: 0.2 % — SIGNIFICANT CHANGE UP (ref 0–2)
BILIRUB SERPL-MCNC: 0.4 MG/DL — SIGNIFICANT CHANGE UP (ref 0.2–1.2)
BUN SERPL-MCNC: 9 MG/DL — SIGNIFICANT CHANGE UP (ref 7–23)
CALCIUM SERPL-MCNC: 8.2 MG/DL — LOW (ref 8.4–10.5)
CHLORIDE SERPL-SCNC: 106 MMOL/L — SIGNIFICANT CHANGE UP (ref 98–107)
CO2 SERPL-SCNC: 22 MMOL/L — SIGNIFICANT CHANGE UP (ref 22–31)
CREAT SERPL-MCNC: 0.62 MG/DL — SIGNIFICANT CHANGE UP (ref 0.5–1.3)
EGFR: 115 ML/MIN/1.73M2 — SIGNIFICANT CHANGE UP
EOSINOPHIL # BLD AUTO: 0.21 K/UL — SIGNIFICANT CHANGE UP (ref 0–0.5)
EOSINOPHIL NFR BLD AUTO: 2 % — SIGNIFICANT CHANGE UP (ref 0–6)
GLUCOSE SERPL-MCNC: 84 MG/DL — SIGNIFICANT CHANGE UP (ref 70–99)
HCT VFR BLD CALC: 25.9 % — LOW (ref 34.5–45)
HGB BLD-MCNC: 8.4 G/DL — LOW (ref 11.5–15.5)
IANC: 6.36 K/UL — SIGNIFICANT CHANGE UP (ref 1.8–7.4)
IMM GRANULOCYTES NFR BLD AUTO: 10 % — HIGH (ref 0–0.9)
INR BLD: <0.9 RATIO — LOW (ref 0.88–1.16)
LDH SERPL L TO P-CCNC: 501 U/L — HIGH (ref 135–225)
LYMPHOCYTES # BLD AUTO: 1.93 K/UL — SIGNIFICANT CHANGE UP (ref 1–3.3)
LYMPHOCYTES # BLD AUTO: 18.7 % — SIGNIFICANT CHANGE UP (ref 13–44)
MCHC RBC-ENTMCNC: 31.9 PG — SIGNIFICANT CHANGE UP (ref 27–34)
MCHC RBC-ENTMCNC: 32.4 GM/DL — SIGNIFICANT CHANGE UP (ref 32–36)
MCV RBC AUTO: 98.5 FL — SIGNIFICANT CHANGE UP (ref 80–100)
MONOCYTES # BLD AUTO: 0.79 K/UL — SIGNIFICANT CHANGE UP (ref 0–0.9)
MONOCYTES NFR BLD AUTO: 7.6 % — SIGNIFICANT CHANGE UP (ref 2–14)
NEUTROPHILS # BLD AUTO: 6.36 K/UL — SIGNIFICANT CHANGE UP (ref 1.8–7.4)
NEUTROPHILS NFR BLD AUTO: 61.5 % — SIGNIFICANT CHANGE UP (ref 43–77)
NRBC # BLD: 0 /100 WBCS — SIGNIFICANT CHANGE UP (ref 0–0)
NRBC # FLD: 0.04 K/UL — HIGH (ref 0–0)
PLATELET # BLD AUTO: 218 K/UL — SIGNIFICANT CHANGE UP (ref 150–400)
POTASSIUM SERPL-MCNC: 4.1 MMOL/L — SIGNIFICANT CHANGE UP (ref 3.5–5.3)
POTASSIUM SERPL-SCNC: 4.1 MMOL/L — SIGNIFICANT CHANGE UP (ref 3.5–5.3)
PROT SERPL-MCNC: 5.9 G/DL — LOW (ref 6–8.3)
PROTHROM AB SERPL-ACNC: 9.8 SEC — LOW (ref 10.5–13.4)
RBC # BLD: 2.63 M/UL — LOW (ref 3.8–5.2)
RBC # FLD: 14.6 % — HIGH (ref 10.3–14.5)
SODIUM SERPL-SCNC: 139 MMOL/L — SIGNIFICANT CHANGE UP (ref 135–145)
URATE SERPL-MCNC: 5.8 MG/DL — SIGNIFICANT CHANGE UP (ref 2.5–7)
WBC # BLD: 10.34 K/UL — SIGNIFICANT CHANGE UP (ref 3.8–10.5)
WBC # FLD AUTO: 10.34 K/UL — SIGNIFICANT CHANGE UP (ref 3.8–10.5)

## 2023-02-07 RX ORDER — NIFEDIPINE 30 MG
1 TABLET, EXTENDED RELEASE 24 HR ORAL
Qty: 30 | Refills: 1
Start: 2023-02-07 | End: 2023-04-07

## 2023-02-07 RX ADMIN — Medication 600 MILLIGRAM(S): at 05:17

## 2023-02-07 RX ADMIN — Medication 600 MILLIGRAM(S): at 05:50

## 2023-02-07 RX ADMIN — SODIUM CHLORIDE 3 MILLILITER(S): 9 INJECTION INTRAMUSCULAR; INTRAVENOUS; SUBCUTANEOUS at 05:50

## 2023-02-07 NOTE — PROGRESS NOTE ADULT - SUBJECTIVE AND OBJECTIVE BOX
OB Progress Note:  PPD#5    S: 41yo PPD#5 s/p . Patient feels well. Pain is well controlled. She is tolerating a regular diet and passing flatus. She is voiding spontaneously, and ambulating without difficulty. Denies CP/SOB. Denies lightheadedness/dizziness. Denies N/V.    O:  Vitals:   Vital Signs Last 24 Hrs  T(C): 36.8 (2023 09:10), Max: 36.8 (2023 20:40)  T(F): 98.2 (2023 09:10), Max: 98.2 (2023 20:40)  HR: 100 (2023 09:10) (89 - 106)  BP: 118/82 (2023 09:10) (116/69 - 154/84)  BP(mean): --  RR: 18 (2023 05:21) (18 - 19)  SpO2: 99% (2023 09:10) (98% - 100%)    Parameters below as of 2023 05:21  Patient On (Oxygen Delivery Method): room air        MEDICATIONS  (STANDING):  diphtheria/tetanus/pertussis (acellular) Vaccine (Adacel) 0.5 milliLiter(s) IntraMuscular once  heparin   Injectable 5000 Unit(s) SubCutaneous every 12 hours  ibuprofen  Tablet. 600 milliGRAM(s) Oral every 6 hours  lactated ringers. 1000 milliLiter(s) (50 mL/Hr) IV Continuous <Continuous>  NIFEdipine XL 30 milliGRAM(s) Oral daily  prenatal multivitamin 1 Tablet(s) Oral daily  sodium chloride 0.9% lock flush 3 milliLiter(s) IV Push every 8 hours    MEDICATIONS  (PRN):  benzocaine 20%/menthol 0.5% Spray 1 Spray(s) Topical every 6 hours PRN for Perineal discomfort  dibucaine 1% Ointment 1 Application(s) Topical every 6 hours PRN Perineal discomfort  diphenhydrAMINE 25 milliGRAM(s) Oral every 6 hours PRN Pruritus  hydrocortisone 1% Cream 1 Application(s) Topical every 6 hours PRN Moderate Pain (4-6)  lanolin Ointment 1 Application(s) Topical every 6 hours PRN nipple soreness  magnesium hydroxide Suspension 30 milliLiter(s) Oral two times a day PRN Constipation  oxyCODONE    IR 5 milliGRAM(s) Oral every 3 hours PRN Moderate to Severe Pain (4-10)  oxyCODONE    IR 5 milliGRAM(s) Oral once PRN Moderate to Severe Pain (4-10)  pramoxine 1%/zinc 5% Cream 1 Application(s) Topical every 4 hours PRN Moderate Pain (4-6)  simethicone 80 milliGRAM(s) Chew every 4 hours PRN Gas  witch hazel Pads 1 Application(s) Topical every 4 hours PRN Perineal discomfort      Labs:  Blood type: O Positive  Rubella IgG: RPR: Negative                          8.4<L>   10.34 >-----------< 218    (  @ 05:41 )             25.9<L>                        7.5<L>   10.60<H> >-----------< 193    (  @ 05:23 )             23.3<L>                        7.1<L>   9.55 >-----------< 165    (  @ 09:48 )             21.4<L>    23 @ 05:41      139  |  106  |  9   ----------------------------<  84  4.1   |  22  |  0.62    23 @ 05:23      139  |  102  |  10  ----------------------------<  89  3.8   |  23  |  0.63    23 @ 06:29      137  |  104  |  19  ----------------------------<  104<H>  4.1   |  22  |  0.62    23 @ 17:19      138  |  106  |  19  ----------------------------<  113<H>  4.0   |  23  |  0.65        Ca    8.2<L>      2023 05:41  Ca    7.2<L>      2023 05:23  Ca    9.0      2023 06:29  Ca    9.0      2023 17:19  Mg     6.50<H>       Mg     5.60<H>       Mg     4.80<H>         TPro  5.9<L>  /  Alb  3.3  /  TBili  0.4  /  DBili  x   /  AST  63<H>  /  ALT  94<H>  /  AlkPhos  94  23 @ 05:41  TPro  5.4<L>  /  Alb  3.1<L>  /  TBili  0.3  /  DBili  x   /  AST  92<H>  /  ALT  109<H>  /  AlkPhos  91  23 @ 05:23  TPro  5.4<L>  /  Alb  3.0<L>  /  TBili  0.2  /  DBili  x   /  AST  106<H>  /  ALT  119<H>  /  AlkPhos  83  23 @ 06:29  TPro  5.5<L>  /  Alb  3.1<L>  /  TBili  0.2  /  DBili  x   /  AST  129<H>  /  ALT  108<H>  /  AlkPhos  82  23 @ 17:19          Physical Exam:  General: NAD  Abdomen: soft, non-tender, non-distended, fundus firm  Vaginal: Lochia wnl  Extremities: No erythema/edema

## 2023-02-07 NOTE — PROGRESS NOTE ADULT - ASSESSMENT
41y/o  PPD#3 from  c/b PPH requiring Sindy placement and uterotonics for EBL 2L leading to DIC . Intrapartum course complicated by chorioamnionitis. Patient is stable on the postpartum floor and recovering well. Patient was found to have elevated AST/ALTs. No RUQ pain or symptoms of liver disease at this time.    #PPH c/b DIC  - s/p 1uPRBC, 2uCryo, 1uFFP, 4uAlbumin  - Hct: 36.5->>27.3->1uPRBC->23.2->>>>23.3->25.9  - Fibrinogen: 551->>91->2 Cryo+1FFP->169->>>340->382  - s/p SINDY    #Chorio  - Tmax T:39.4; remains afebrile x24 hours  - s/p Invanz x3  - WBC: 7.89->>>18->>>10.6->10.34    #Transaminitis   - AST/ALT: 22/14->>>50/15->124/82->128/108->106/119->92/109->/109  - Hold tylenol  - In the setting of cHTN, will monitor for siPEC with severe features    #siPEC w/ SF (BP+LFT)  - no home meds, not currently on antihypertensives  - BPs 110-140/60-70  - No PEC symptoms   -LFTs downtrending  -s/p Mg  - continue to monitor BPs and for symptoms    #Postpartum state  -  axiliary masses, plan to monitor symptomatically  - Continue with po analgesia  - Increase ambulation  - Continue regular diet  - IV lock    Camacho James PGY 1

## 2023-02-08 ENCOUNTER — NON-APPOINTMENT (OUTPATIENT)
Age: 43
End: 2023-02-08

## 2023-02-09 ENCOUNTER — NON-APPOINTMENT (OUTPATIENT)
Age: 43
End: 2023-02-09

## 2023-02-09 LAB
SARS-COV-2 N GENE NPH QL NAA+PROBE: NOT DETECTED
SARS-COV-2 N GENE NPH QL NAA+PROBE: NOT DETECTED

## 2023-02-09 RX ORDER — NIFEDIPINE 30 MG/1
30 TABLET, EXTENDED RELEASE ORAL
Qty: 30 | Refills: 0 | Status: ACTIVE | COMMUNITY
Start: 2023-02-09

## 2023-03-02 LAB — SURGICAL PATHOLOGY STUDY: SIGNIFICANT CHANGE UP

## 2023-03-10 ENCOUNTER — APPOINTMENT (OUTPATIENT)
Dept: OBGYN | Facility: CLINIC | Age: 43
End: 2023-03-10
Payer: COMMERCIAL

## 2023-03-10 VITALS
WEIGHT: 136 LBS | DIASTOLIC BLOOD PRESSURE: 82 MMHG | HEART RATE: 96 BPM | HEIGHT: 64 IN | SYSTOLIC BLOOD PRESSURE: 129 MMHG | BODY MASS INDEX: 23.22 KG/M2

## 2023-03-10 DIAGNOSIS — Z87.59 PERSONAL HISTORY OF OTHER COMPLICATIONS OF PREGNANCY, CHILDBIRTH AND THE PUERPERIUM: ICD-10-CM

## 2023-03-10 PROBLEM — I10 CHRONIC HYPERTENSION: Status: ACTIVE | Noted: 2023-03-10

## 2023-03-10 PROBLEM — Z34.80 ENCOUNTER FOR SUPERVISION OF OTHER NORMAL PREGNANCY: Status: RESOLVED | Noted: 2022-10-27 | Resolved: 2023-03-10

## 2023-03-10 PROBLEM — O10.919 HTN IN PREGNANCY, CHRONIC: Status: RESOLVED | Noted: 2023-01-11 | Resolved: 2023-03-10

## 2023-03-10 PROBLEM — Z34.90 NORMAL REPEAT PREGNANCY, ANTEPARTUM: Status: RESOLVED | Noted: 2022-06-15 | Resolved: 2023-03-10

## 2023-03-10 PROCEDURE — 0503F POSTPARTUM CARE VISIT: CPT

## 2023-03-10 NOTE — HISTORY OF PRESENT ILLNESS
[Postpartum Follow Up] : postpartum follow up [Complications:___] : complications include: [unfilled] [Vaginal Delivery] : vaginal delivery [Delivery Date Was ___] : delivery date was [unfilled] [Pertussis Vaccine] : Pertussis vaccine administered [Girl] : baby is a girl [Infant's Name ___] : [unfilled] [___ Lbs] : [unfilled] lbs [___ Oz] : [unfilled] oz [NICU Admission] : NICU admission [Living at Home] : is currently living at home [Bottle Feeding] : bottle feeding [Breastfeeding] : currently nursing [BF with Difficulty] : nursing with difficulty [] : delivered by vaginal delivery [Rhogam] : Rhogam was not administered [Rubella Vaccine] : Rubella vaccine was not administered [BTL] : no tubal ligation [Resumed Menses] : has not resumed her menses [Resumed [place]] : has not resumed intercourse [Intended Contraception] : the patient does not intended to use contraception postpartum [None] : No associated symptoms are reported [Awake] : awake [Alert] : alert [Acute Distress] : no acute distress [Mass] : no breast mass [Nipple Discharge] : no nipple discharge [Axillary LAD] : no axillary lymphadenopathy [Soft] : soft [Tender] : non tender [Distended] : not distended [Oriented x3] : oriented to person, place, and time [Depressed Mood] : not depressed [Flat Affect] : affect not flat [Normal] : external genitalia [Motion Tenderness] : there was no cervical motion tenderness [Tenderness] : nontender [Adnexa Tenderness] : were not tender [Doing Well] : is doing well [No Sign of Infection] : is showing no signs of infection [Excellent Pain Control] : has excellent pain control [FreeTextEntry8] : This 41 yo P 2012 presents post vaginal delivery for PPV; feels well, voiding and stooling without issue, no desire for hormonal contraception at this time. [de-identified] : HIGH BLOOD PRESSURE [de-identified] : Advised F/U with Dr. Garcia; Kegel exercise sheet and pelvic floor class info in hand; RTO 4 months annual [de-identified] : Nl PPV; hx of chronic HTN

## 2023-03-28 NOTE — OB NEONATOLOGY/PEDIATRICIAN DELIVERY SUMMARY - NSDELIVERYTYPEA_OBGYN_ALL_OB
· Blood pressure stable  · Continue metoprolol   · Lasix held on admission, resumed yesterday  Appears euvolemic  Vaginal Delivery

## 2023-07-06 ENCOUNTER — APPOINTMENT (OUTPATIENT)
Dept: OBGYN | Facility: CLINIC | Age: 43
End: 2023-07-06

## 2023-10-04 ENCOUNTER — APPOINTMENT (OUTPATIENT)
Dept: INTERNAL MEDICINE | Facility: CLINIC | Age: 43
End: 2023-10-04
Payer: COMMERCIAL

## 2023-10-04 VITALS
OXYGEN SATURATION: 98 % | HEIGHT: 64 IN | WEIGHT: 130 LBS | BODY MASS INDEX: 22.2 KG/M2 | HEART RATE: 79 BPM | SYSTOLIC BLOOD PRESSURE: 110 MMHG | DIASTOLIC BLOOD PRESSURE: 75 MMHG

## 2023-10-04 DIAGNOSIS — E55.9 VITAMIN D DEFICIENCY, UNSPECIFIED: ICD-10-CM

## 2023-10-04 DIAGNOSIS — Z12.39 ENCOUNTER FOR OTHER SCREENING FOR MALIGNANT NEOPLASM OF BREAST: ICD-10-CM

## 2023-10-04 DIAGNOSIS — Z00.00 ENCOUNTER FOR GENERAL ADULT MEDICAL EXAMINATION W/OUT ABNORMAL FINDINGS: ICD-10-CM

## 2023-10-04 PROCEDURE — 99396 PREV VISIT EST AGE 40-64: CPT

## 2023-10-05 ENCOUNTER — TRANSCRIPTION ENCOUNTER (OUTPATIENT)
Age: 43
End: 2023-10-05

## 2023-10-05 LAB
25(OH)D3 SERPL-MCNC: 32.3 NG/ML
ALBUMIN SERPL ELPH-MCNC: 4.8 G/DL
ALP BLD-CCNC: 83 U/L
ALT SERPL-CCNC: 11 U/L
ANION GAP SERPL CALC-SCNC: 13 MMOL/L
AST SERPL-CCNC: 18 U/L
BASOPHILS # BLD AUTO: 0.07 K/UL
BASOPHILS NFR BLD AUTO: 0.8 %
BILIRUB SERPL-MCNC: 0.6 MG/DL
BUN SERPL-MCNC: 16 MG/DL
CALCIUM SERPL-MCNC: 9.8 MG/DL
CHLORIDE SERPL-SCNC: 104 MMOL/L
CHOLEST SERPL-MCNC: 187 MG/DL
CO2 SERPL-SCNC: 24 MMOL/L
CREAT SERPL-MCNC: 0.66 MG/DL
EGFR: 112 ML/MIN/1.73M2
EOSINOPHIL # BLD AUTO: 0.11 K/UL
EOSINOPHIL NFR BLD AUTO: 1.3 %
ESTIMATED AVERAGE GLUCOSE: 117 MG/DL
GLUCOSE SERPL-MCNC: 100 MG/DL
HBA1C MFR BLD HPLC: 5.7 %
HCT VFR BLD CALC: 41.5 %
HDLC SERPL-MCNC: 67 MG/DL
HGB BLD-MCNC: 13.7 G/DL
IMM GRANULOCYTES NFR BLD AUTO: 0.3 %
LDLC SERPL CALC-MCNC: 105 MG/DL
LYMPHOCYTES # BLD AUTO: 2.37 K/UL
LYMPHOCYTES NFR BLD AUTO: 27.2 %
MAN DIFF?: NORMAL
MCHC RBC-ENTMCNC: 31.3 PG
MCHC RBC-ENTMCNC: 33 GM/DL
MCV RBC AUTO: 94.7 FL
MONOCYTES # BLD AUTO: 0.64 K/UL
MONOCYTES NFR BLD AUTO: 7.3 %
NEUTROPHILS # BLD AUTO: 5.5 K/UL
NEUTROPHILS NFR BLD AUTO: 63.1 %
NONHDLC SERPL-MCNC: 120 MG/DL
PLATELET # BLD AUTO: 260 K/UL
POTASSIUM SERPL-SCNC: 4.4 MMOL/L
PROT SERPL-MCNC: 7.6 G/DL
RBC # BLD: 4.38 M/UL
RBC # FLD: 12.3 %
SODIUM SERPL-SCNC: 141 MMOL/L
TRIGL SERPL-MCNC: 85 MG/DL
TSH SERPL-ACNC: 0.4 UIU/ML
VIT B12 SERPL-MCNC: 489 PG/ML
WBC # FLD AUTO: 8.72 K/UL

## 2024-01-16 ENCOUNTER — APPOINTMENT (OUTPATIENT)
Dept: INTERNAL MEDICINE | Facility: CLINIC | Age: 44
End: 2024-01-16
Payer: COMMERCIAL

## 2024-01-16 VITALS
WEIGHT: 127 LBS | DIASTOLIC BLOOD PRESSURE: 81 MMHG | BODY MASS INDEX: 21.68 KG/M2 | HEART RATE: 86 BPM | HEIGHT: 64 IN | SYSTOLIC BLOOD PRESSURE: 124 MMHG | OXYGEN SATURATION: 98 %

## 2024-01-16 DIAGNOSIS — F32.0 MAJOR DEPRESSIVE DISORDER, SINGLE EPISODE, MILD: ICD-10-CM

## 2024-01-16 PROCEDURE — 99213 OFFICE O/P EST LOW 20 MIN: CPT

## 2024-01-16 PROCEDURE — G2211 COMPLEX E/M VISIT ADD ON: CPT

## 2024-01-16 NOTE — HISTORY OF PRESENT ILLNESS
[FreeTextEntry1] : depressed mood [de-identified] : 44 yo F, 11 months postpartum. She works full time, has 11 month old daughter, 11 year and 13 year old at home. She feels her life is the same, go to work, cook, clean, care for the kids. Infant daughter still wakes up in middle of night.  is supportive but also going through issues of his own. She has never had postpartum depression before or any form of depression. She feels very tired, has no motivation/interest to do anything. She also reports low libido. She does have GYN appt in Feb.

## 2024-03-01 ENCOUNTER — APPOINTMENT (OUTPATIENT)
Dept: OBGYN | Facility: CLINIC | Age: 44
End: 2024-03-01
Payer: COMMERCIAL

## 2024-03-01 VITALS
WEIGHT: 127 LBS | DIASTOLIC BLOOD PRESSURE: 77 MMHG | HEIGHT: 64 IN | BODY MASS INDEX: 21.68 KG/M2 | SYSTOLIC BLOOD PRESSURE: 114 MMHG | HEART RATE: 93 BPM

## 2024-03-01 DIAGNOSIS — Z30.09 ENCOUNTER FOR OTHER GENERAL COUNSELING AND ADVICE ON CONTRACEPTION: ICD-10-CM

## 2024-03-01 DIAGNOSIS — R68.82 DECREASED LIBIDO: ICD-10-CM

## 2024-03-01 DIAGNOSIS — Z01.419 ENCOUNTER FOR GYNECOLOGICAL EXAMINATION (GENERAL) (ROUTINE) W/OUT ABNORMAL FINDINGS: ICD-10-CM

## 2024-03-01 PROCEDURE — 99396 PREV VISIT EST AGE 40-64: CPT

## 2024-03-01 PROCEDURE — 99213 OFFICE O/P EST LOW 20 MIN: CPT | Mod: 25

## 2024-03-02 PROBLEM — Z30.09 GENERAL COUNSELING AND ADVICE FOR CONTRACEPTIVE MANAGEMENT: Status: ACTIVE | Noted: 2024-03-02

## 2024-03-02 PROBLEM — Z01.419 WOMEN'S ANNUAL ROUTINE GYNECOLOGICAL EXAMINATION: Status: ACTIVE | Noted: 2021-12-15

## 2024-03-02 PROBLEM — R68.82 DECREASED LIBIDO: Status: ACTIVE | Noted: 2024-03-02

## 2024-03-02 NOTE — PLAN
[FreeTextEntry1] :  - Pt advised about options for libido and contraception, pt to take time to decide. - Referral for mammogram given.  - RTO in 6 months for contraception discussion.

## 2024-03-02 NOTE — HISTORY OF PRESENT ILLNESS
[Withdrawal] : uses withdrawal [Natural Family Planning] : uses natural family planning [Monogamous (Male Partner)] : is monogamous with a male partner [Y] : Positive pregnancy history [No] : No [FreeTextEntry1] : 43-year-old female LMP: 02/07/24 presents for annual GYN exam. Pt reports she has been well, but reports being tired due to lifestyle. Pt reports she stopped breastfeeding 3 months ago. Pt c/o of low libido after birth and inquired about methods to increase libido and advised about making dedicated time, options for sex therapist. She denies any pain with intercourse and able to have orgasm.  Pt reports the relationship is otherwise good. Pt reports she uses withdrawal method for contraception, pt advised to monitor cycles and ovulation. Pt reports she has been monitoring her BP at work and reports levels are WNL. Discussed different types of contraception.  [PapSmeardate] : 12/2021 [PGHxTotal] : 2 [[facility] : [facility] [BannerxLiving] : 2 [FreeTextEntry2] : Not currently active

## 2024-03-02 NOTE — ASSESSMENT
[TextEntry] : pleasant 43 year old female with normal gyn exam. Decreased libido and effective contraceptive methods discussed. Ways to work on libido offered.

## 2024-03-02 NOTE — PHYSICAL EXAM
[Chaperone Present] : A chaperone was present in the examining room during all aspects of the physical examination [Appropriately responsive] : appropriately responsive [Alert] : alert [No Acute Distress] : no acute distress [No Lymphadenopathy] : no lymphadenopathy [Regular Rate Rhythm] : regular rate rhythm [No Murmurs] : no murmurs [Clear to Auscultation B/L] : clear to auscultation bilaterally [Soft] : soft [Non-distended] : non-distended [Non-tender] : non-tender [No HSM] : No HSM [No Lesions] : no lesions [No Mass] : no mass [Oriented x3] : oriented x3 [Examination Of The Breasts] : a normal appearance [No Discharge] : no discharge [No Masses] : no breast masses were palpable [Labia Majora] : normal [Labia Minora] : normal [Normal] : normal [No Tenderness] : no tenderness [Nl Sphincter Tone] : normal sphincter tone [Retroversion] : retroverted [FreeTextEntry9] : confirms vaginal exam [Uterine Adnexae] : non-palpable

## 2024-03-02 NOTE — DISCUSSION/SUMMARY
[FreeTextEntry1] : This note was written by Effie Crespo on 03/01/2024 actively solely Liudmila Rae M.D.  All medical record entries made by this scribe at my, Liudmila Rae M.D direction and personally dictated by me on 03/01/2024. I have personally reviewed the chart and agree that the record reflects my personal performance of the history, physical exam, assessment, and plan.

## 2024-03-10 LAB
C TRACH RRNA SPEC QL NAA+PROBE: NOT DETECTED
N GONORRHOEA RRNA SPEC QL NAA+PROBE: NOT DETECTED
SOURCE AMPLIFICATION: NORMAL

## 2024-03-28 LAB
25(OH)D3 SERPL-MCNC: 44.3 NG/ML
ALBUMIN SERPL ELPH-MCNC: 4.1 G/DL
ALP BLD-CCNC: 49 U/L
ALT SERPL-CCNC: 22 U/L
ANION GAP SERPL CALC-SCNC: 11 MMOL/L
ANION GAP SERPL CALC-SCNC: 12 MMOL/L
APPEARANCE: CLEAR
AST SERPL-CCNC: 19 U/L
BACTERIA: NEGATIVE
BASOPHILS # BLD AUTO: 0.03 K/UL
BASOPHILS NFR BLD AUTO: 0.3 %
BILIRUB SERPL-MCNC: 0.2 MG/DL
BILIRUBIN URINE: NEGATIVE
BLOOD URINE: NEGATIVE
BUN SERPL-MCNC: 13 MG/DL
BUN SERPL-MCNC: 13 MG/DL
CALCIUM SERPL-MCNC: 9.7 MG/DL
CALCIUM SERPL-MCNC: 9.8 MG/DL
CHLORIDE SERPL-SCNC: 101 MMOL/L
CHLORIDE SERPL-SCNC: 103 MMOL/L
CHOLEST SERPL-MCNC: 217 MG/DL
CO2 SERPL-SCNC: 23 MMOL/L
CO2 SERPL-SCNC: 23 MMOL/L
COLOR: NORMAL
CREAT SERPL-MCNC: 0.57 MG/DL
CREAT SERPL-MCNC: 0.64 MG/DL
EGFR: 114 ML/MIN/1.73M2
EGFR: 117 ML/MIN/1.73M2
EOSINOPHIL # BLD AUTO: 0.1 K/UL
EOSINOPHIL NFR BLD AUTO: 1.1 %
ESTIMATED AVERAGE GLUCOSE: 108 MG/DL
GLUCOSE QUALITATIVE U: NEGATIVE
GLUCOSE SERPL-MCNC: 92 MG/DL
GLUCOSE SERPL-MCNC: 94 MG/DL
HBA1C MFR BLD HPLC: 5.4 %
HCT VFR BLD CALC: 37.7 %
HDLC SERPL-MCNC: 97 MG/DL
HGB BLD-MCNC: 12.5 G/DL
HYALINE CASTS: 1 /LPF
IMM GRANULOCYTES NFR BLD AUTO: 0.6 %
KETONES URINE: NEGATIVE
LDLC SERPL CALC-MCNC: 89 MG/DL
LEUKOCYTE ESTERASE URINE: NEGATIVE
LYMPHOCYTES # BLD AUTO: 1.94 K/UL
LYMPHOCYTES NFR BLD AUTO: 21 %
MAGNESIUM SERPL-MCNC: 2.1 MG/DL
MAN DIFF?: NORMAL
MCHC RBC-ENTMCNC: 32.9 PG
MCHC RBC-ENTMCNC: 33.2 GM/DL
MCV RBC AUTO: 99.2 FL
MICROSCOPIC-UA: NORMAL
MONOCYTES # BLD AUTO: 0.66 K/UL
MONOCYTES NFR BLD AUTO: 7.1 %
NEUTROPHILS # BLD AUTO: 6.46 K/UL
NEUTROPHILS NFR BLD AUTO: 69.9 %
NITRITE URINE: NEGATIVE
NONHDLC SERPL-MCNC: 120 MG/DL
PH URINE: 6.5
PHOSPHATE SERPL-MCNC: 4.2 MG/DL
PLATELET # BLD AUTO: 293 K/UL
POTASSIUM SERPL-SCNC: 4.7 MMOL/L
POTASSIUM SERPL-SCNC: 4.9 MMOL/L
PROT SERPL-MCNC: 7 G/DL
PROTEIN URINE: NORMAL
RBC # BLD: 3.8 M/UL
RBC # FLD: 13.2 %
RED BLOOD CELLS URINE: 2 /HPF
SODIUM SERPL-SCNC: 136 MMOL/L
SODIUM SERPL-SCNC: 137 MMOL/L
SPECIFIC GRAVITY URINE: 1.02
SQUAMOUS EPITHELIAL CELLS: 5 /HPF
T3RU NFR SERPL: 1.3 TBI
T4 FREE SERPL-MCNC: 1 NG/DL
T4 FREE SERPL-MCNC: 1.2 NG/DL
T4 SERPL-MCNC: 12.4 UG/DL
TRIGL SERPL-MCNC: 154 MG/DL
TSH SERPL-ACNC: 0.23 UIU/ML
TSH SERPL-ACNC: 0.71 UIU/ML
URATE SERPL-MCNC: 4.9 MG/DL
UROBILINOGEN URINE: NORMAL
WBC # FLD AUTO: 9.25 K/UL
WHITE BLOOD CELLS URINE: 3 /HPF

## 2024-04-26 RX ORDER — SERTRALINE 25 MG/1
25 TABLET, FILM COATED ORAL
Qty: 60 | Refills: 1 | Status: ACTIVE | COMMUNITY
Start: 2024-04-26 | End: 1900-01-01

## 2024-09-11 ENCOUNTER — APPOINTMENT (OUTPATIENT)
Dept: OBGYN | Facility: CLINIC | Age: 44
End: 2024-09-11

## 2024-10-16 ENCOUNTER — APPOINTMENT (OUTPATIENT)
Dept: OBGYN | Facility: CLINIC | Age: 44
End: 2024-10-16

## 2024-11-26 DIAGNOSIS — E55.9 VITAMIN D DEFICIENCY, UNSPECIFIED: ICD-10-CM

## 2024-12-03 ENCOUNTER — APPOINTMENT (OUTPATIENT)
Dept: INTERNAL MEDICINE | Facility: CLINIC | Age: 44
End: 2024-12-03
Payer: COMMERCIAL

## 2024-12-03 VITALS
BODY MASS INDEX: 22.02 KG/M2 | HEIGHT: 64 IN | SYSTOLIC BLOOD PRESSURE: 124 MMHG | OXYGEN SATURATION: 99 % | HEART RATE: 90 BPM | DIASTOLIC BLOOD PRESSURE: 77 MMHG | WEIGHT: 129 LBS

## 2024-12-03 DIAGNOSIS — Z00.00 ENCOUNTER FOR GENERAL ADULT MEDICAL EXAMINATION W/OUT ABNORMAL FINDINGS: ICD-10-CM

## 2024-12-03 DIAGNOSIS — Z12.39 ENCOUNTER FOR OTHER SCREENING FOR MALIGNANT NEOPLASM OF BREAST: ICD-10-CM

## 2024-12-03 LAB
25(OH)D3 SERPL-MCNC: 23 NG/ML
ALBUMIN SERPL ELPH-MCNC: 4.3 G/DL
ALP BLD-CCNC: 79 U/L
ALT SERPL-CCNC: 10 U/L
ANION GAP SERPL CALC-SCNC: 14 MMOL/L
AST SERPL-CCNC: 17 U/L
BASOPHILS # BLD AUTO: 0.04 K/UL
BASOPHILS NFR BLD AUTO: 0.8 %
BILIRUB SERPL-MCNC: 0.4 MG/DL
BUN SERPL-MCNC: 17 MG/DL
CALCIUM SERPL-MCNC: 9.3 MG/DL
CHLORIDE SERPL-SCNC: 104 MMOL/L
CHOLEST SERPL-MCNC: 178 MG/DL
CO2 SERPL-SCNC: 23 MMOL/L
CREAT SERPL-MCNC: 0.69 MG/DL
EGFR: 110 ML/MIN/1.73M2
EOSINOPHIL # BLD AUTO: 0.07 K/UL
EOSINOPHIL NFR BLD AUTO: 1.4 %
ESTIMATED AVERAGE GLUCOSE: 114 MG/DL
GLUCOSE SERPL-MCNC: 108 MG/DL
HBA1C MFR BLD HPLC: 5.6 %
HCT VFR BLD CALC: 40.2 %
HDLC SERPL-MCNC: 65 MG/DL
HGB BLD-MCNC: 13.4 G/DL
IMM GRANULOCYTES NFR BLD AUTO: 0.4 %
LDLC SERPL CALC-MCNC: 98 MG/DL
LYMPHOCYTES # BLD AUTO: 1.56 K/UL
LYMPHOCYTES NFR BLD AUTO: 31.8 %
MAN DIFF?: NORMAL
MCHC RBC-ENTMCNC: 31.2 PG
MCHC RBC-ENTMCNC: 33.3 G/DL
MCV RBC AUTO: 93.5 FL
MONOCYTES # BLD AUTO: 0.31 K/UL
MONOCYTES NFR BLD AUTO: 6.3 %
NEUTROPHILS # BLD AUTO: 2.91 K/UL
NEUTROPHILS NFR BLD AUTO: 59.3 %
NONHDLC SERPL-MCNC: 112 MG/DL
PLATELET # BLD AUTO: 277 K/UL
POTASSIUM SERPL-SCNC: 4.5 MMOL/L
PROT SERPL-MCNC: 7.2 G/DL
RBC # BLD: 4.3 M/UL
RBC # FLD: 12.2 %
SODIUM SERPL-SCNC: 141 MMOL/L
TRIGL SERPL-MCNC: 77 MG/DL
TSH SERPL-ACNC: 0.96 UIU/ML
VIT B12 SERPL-MCNC: 427 PG/ML
WBC # FLD AUTO: 4.91 K/UL

## 2024-12-03 PROCEDURE — 99396 PREV VISIT EST AGE 40-64: CPT

## 2024-12-06 ENCOUNTER — NON-APPOINTMENT (OUTPATIENT)
Age: 44
End: 2024-12-06

## 2024-12-29 ENCOUNTER — NON-APPOINTMENT (OUTPATIENT)
Age: 44
End: 2024-12-29

## 2025-01-22 ENCOUNTER — NON-APPOINTMENT (OUTPATIENT)
Age: 45
End: 2025-01-22

## 2025-05-31 ENCOUNTER — NON-APPOINTMENT (OUTPATIENT)
Age: 45
End: 2025-05-31

## 2025-05-31 ENCOUNTER — APPOINTMENT (OUTPATIENT)
Dept: OBGYN | Facility: CLINIC | Age: 45
End: 2025-05-31
Payer: COMMERCIAL

## 2025-05-31 VITALS
WEIGHT: 130 LBS | HEIGHT: 64 IN | DIASTOLIC BLOOD PRESSURE: 72 MMHG | BODY MASS INDEX: 22.2 KG/M2 | SYSTOLIC BLOOD PRESSURE: 106 MMHG

## 2025-05-31 DIAGNOSIS — Z87.42 PERSONAL HISTORY OF OTHER DISEASES OF THE FEMALE GENITAL TRACT: ICD-10-CM

## 2025-05-31 DIAGNOSIS — Z01.419 ENCOUNTER FOR GYNECOLOGICAL EXAMINATION (GENERAL) (ROUTINE) W/OUT ABNORMAL FINDINGS: ICD-10-CM

## 2025-05-31 DIAGNOSIS — L53.9 ERYTHEMATOUS CONDITION, UNSPECIFIED: ICD-10-CM

## 2025-05-31 DIAGNOSIS — N89.8 OTHER SPECIFIED NONINFLAMMATORY DISORDERS OF VAGINA: ICD-10-CM

## 2025-05-31 PROCEDURE — 99213 OFFICE O/P EST LOW 20 MIN: CPT | Mod: 25

## 2025-05-31 PROCEDURE — 99396 PREV VISIT EST AGE 40-64: CPT

## 2025-06-04 LAB
BV BACTERIA RRNA VAG QL NAA+PROBE: NOT DETECTED
C GLABRATA RNA VAG QL NAA+PROBE: NOT DETECTED
CANDIDA RRNA VAG QL PROBE: NOT DETECTED
CYTOLOGY CVX/VAG DOC THIN PREP: NORMAL
HPV HIGH+LOW RISK DNA PNL CVX: NOT DETECTED
T VAGINALIS RRNA SPEC QL NAA+PROBE: NOT DETECTED